# Patient Record
Sex: FEMALE | Race: WHITE | NOT HISPANIC OR LATINO | Employment: FULL TIME | URBAN - NONMETROPOLITAN AREA
[De-identification: names, ages, dates, MRNs, and addresses within clinical notes are randomized per-mention and may not be internally consistent; named-entity substitution may affect disease eponyms.]

---
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
## 2024-01-15 ENCOUNTER — ROUTINE PRENATAL (OUTPATIENT)
Dept: PERINATAL CARE | Facility: OTHER | Age: 19
End: 2024-01-15
Payer: COMMERCIAL

## 2024-01-15 ENCOUNTER — TELEPHONE (OUTPATIENT)
Dept: PERINATAL CARE | Facility: CLINIC | Age: 19
End: 2024-01-15

## 2024-01-15 VITALS
BODY MASS INDEX: 23.32 KG/M2 | SYSTOLIC BLOOD PRESSURE: 122 MMHG | WEIGHT: 131.6 LBS | DIASTOLIC BLOOD PRESSURE: 68 MMHG | HEART RATE: 110 BPM | HEIGHT: 63 IN

## 2024-01-15 DIAGNOSIS — Z87.74 HISTORY OF ATRIAL SEPTAL DEFECT: Primary | ICD-10-CM

## 2024-01-15 DIAGNOSIS — Z3A.01 7 WEEKS GESTATION OF PREGNANCY: ICD-10-CM

## 2024-01-15 DIAGNOSIS — Q24.9 CONGENITAL HEART DISEASE DURING PREGNANCY: ICD-10-CM

## 2024-01-15 DIAGNOSIS — O99.891 CONGENITAL HEART DISEASE DURING PREGNANCY: ICD-10-CM

## 2024-01-15 DIAGNOSIS — Z3A.12 12 WEEKS GESTATION OF PREGNANCY: ICD-10-CM

## 2024-01-15 DIAGNOSIS — Z36.82 ENCOUNTER FOR (NT) NUCHAL TRANSLUCENCY SCAN: ICD-10-CM

## 2024-01-15 PROCEDURE — 76801 OB US < 14 WKS SINGLE FETUS: CPT | Performed by: OBSTETRICS & GYNECOLOGY

## 2024-01-15 PROCEDURE — 76813 OB US NUCHAL MEAS 1 GEST: CPT | Performed by: OBSTETRICS & GYNECOLOGY

## 2024-01-15 PROCEDURE — 99243 OFF/OP CNSLTJ NEW/EST LOW 30: CPT | Performed by: OBSTETRICS & GYNECOLOGY

## 2024-01-15 NOTE — PROGRESS NOTES
"[facility]: Portia Osorio was seen today for nuchal translucency ultrasound.  See ultrasound report under \"OB Procedures\" tab.      MDM:   I. Diagnoses/Problems addressed:  Aneuploidy screening, maternal history of ASD  II.  Data: I ordered the following tests: TTE, NIPS.  III.  Risk of morbidity: Low    Please don't hesitate to contact our office with any concerns or questions.  -Myesha Aguirre MD    "

## 2024-01-15 NOTE — LETTER
"January 15, 2024    Margaux Gibson MD  [address]  [address]  [place] [id number]    Patient: Portia Osorio   YOB: 2005   Date of Visit: 1/15/2024   Gestational age 12w6d   Nature of this communication: Routine       Dear Dr Gibson,    This patient was seen recently in our  office.  The content of my evaluation today is in the ultrasound report under \"OB Procedures\" tab.     Please don't hesitate to contact our office with any concerns or questions.     Sincerely,      Myesha Aguirre MD  Attending Physician, Maternal-Fetal Medicine  [facility]      "

## 2024-01-15 NOTE — PROGRESS NOTES
Patient has [employer]  insurance. Patient was offered the self pay option through [employer] but declined. Explained to patient her insurance requires a prior authorization before [employer] NIPS can be drawn. In-basket message routed to Maternal Fetal Medicine clinical pool to complete prior authorization. Our office will contact the patient within 10-14 business days with the status of authorization. If patient does not hear back from our Maternal Fetal Medicine office after 14 business days to please call [phone].     Patient is aware if this test is drawn without prior authorization she may be responsible for full cost of test. Insurance Authorization is not a guarantee of payment and final determination is based on your member benefits, appropriateness of service provided and eligibility at time of services rendered and claim received.

## 2024-01-15 NOTE — TELEPHONE ENCOUNTER
Per [place] insurance via [employer] -no prior authorization or medial review is required for NIPT [id number].    Phone call to patient and notified insurance plan does not require authorization for NIPT.    Encouraged patient to determine if she met her lab deductible requirement with insurance plan.    For complete NIPT billing information patient can visit  [email or web address] or call Client Services  @ [phone].     Portia will call back at [phone] to schedule a nurse visit to have her blood drawn in office.

## 2024-01-15 NOTE — TELEPHONE ENCOUNTER
----- Message from Andry Whitfield, RN sent at 1/15/2024  8:58 AM EST -----  Regarding: Auth NIPS Mindy  Good Morning,    Above Patient would like [employer] NIPS with SCA and Gender  [employer] needs prior auth   Z36.0 Z33.1  standard codes   Dr. Aguirre      If someone can check the portal (I do not have access to it TY)  If Not I will call later      ~Andry

## 2024-01-22 PROBLEM — Z3A.14 14 WEEKS GESTATION OF PREGNANCY: Status: ACTIVE | Noted: 2023-12-05

## 2024-01-22 NOTE — PROGRESS NOTES
"OB/GYN  PN Visit  Portia Osorio  [id number]  2024  8:36 AM  Dr. Gillian Pretty MD    S: 18 y.o.  14 3/7 here for PN visit.       Chief Complaint   Patient presents with    Routine Prenatal Visit     No spotting. No pain or cramping.     Spotting had spotting, light pink, small clot, small amount that resolved.  No recent intercourse      OB complaints:  Contractions: no  Leakage: no  Bleeding: no  Fetal movement: no      O:  /82 (BP Location: Right arm, Patient Position: Sitting, Cuff Size: Adult)   Pulse (!) 109   Ht 5' 3\" (1.6 m)   Wt 59.4 kg (131 lb)   LMP 10/17/2023 (Exact Date)   BMI 23.21 kg/m²       Review of Systems   Constitutional: Negative.    HENT: Negative.     Eyes: Negative.    Respiratory: Negative.     Cardiovascular: Negative.    Gastrointestinal: Negative.    Endocrine: Negative.    Genitourinary:         As noted in HPI   Musculoskeletal: Negative.    Skin: Negative.    Allergic/Immunologic: Negative.    Neurological: Negative.    Hematological: Negative.    Psychiatric/Behavioral: Negative.           Physical Exam  Constitutional:       General: She is not in acute distress.     Appearance: Normal appearance. She is well-developed.   Abdominal:      Palpations: Abdomen is soft.      Tenderness: There is no abdominal tenderness. There is no guarding.   Neurological:      Mental Status: She is alert and oriented to person, place, and time.   Skin:     General: Skin is warm and dry.   Psychiatric:         Behavior: Behavior normal.             Pregravid Weight/BMI: 57.2 kg (126 lb) (BMI 22.33)  Current Weight: 59.4 kg (131 lb)   Total Weight Gain: 2.268 kg (5 lb)   Pre-Summer Vitals      Flowsheet Row Most Recent Value   Prenatal Assessment    Fetal Heart Rate 153   Prenatal Vitals    Blood Pressure 122/82   Weight - Scale 59.4 kg (131 lb)   Urine Albumin/Glucose    Dilation/Effacement/Station    Vaginal Drainage    Edema              TWG 25-35 lbs    Problem List       "    Other    14 weeks gestation of pregnancy    Supervision of normal first pregnancy, antepartum    Overview       CF/ SMA testing: declines  Flu vaccine: not completed-  declines  Covid vaccine:  completed X 2           Marijuana use    Overview     Urine drug screen ordered: + THC (24)         History of atrial septal defect    Overview     Resolved by age2, no surgery required         Rubella non-immune status, antepartum    Overview     Rubella titre: borderline, consider postpartum vaccine          Other Visit Diagnoses       Second trimester pregnancy    -  Primary    Need for maternal serum alpha-protein (MSAFP) screening               AFP ordered  NIPT not done - Level 2 ordered      Future Appointments   Date Time Provider Department Center   3/5/2024  8:00 AM  US 1 [facility]   3/21/2024  8:00 AM  US 2 [facility]               Gillian Pretty MD  2024  8:36 AM

## 2024-01-25 ENCOUNTER — TELEPHONE (OUTPATIENT)
Dept: OBGYN CLINIC | Facility: CLINIC | Age: 19
End: 2024-01-25

## 2024-01-25 ENCOUNTER — APPOINTMENT (OUTPATIENT)
Dept: LAB | Facility: HOSPITAL | Age: 19
End: 2024-01-25
Payer: COMMERCIAL

## 2024-01-25 DIAGNOSIS — Z34.90 ENCOUNTER FOR SUPERVISION OF NORMAL PREGNANCY, ANTEPARTUM, UNSPECIFIED GRAVIDITY: ICD-10-CM

## 2024-01-25 DIAGNOSIS — Z3A.10 10 WEEKS GESTATION OF PREGNANCY: ICD-10-CM

## 2024-01-25 LAB
ABO GROUP BLD: NORMAL
AMPHETAMINES SERPL QL SCN: NEGATIVE
BARBITURATES UR QL: NEGATIVE
BASOPHILS # BLD AUTO: 0.02 THOUSANDS/ÂΜL (ref 0–0.1)
BASOPHILS NFR BLD AUTO: 0 % (ref 0–1)
BENZODIAZ UR QL: NEGATIVE
BILIRUB UR QL STRIP: NEGATIVE
BLD GP AB SCN SERPL QL: NEGATIVE
CLARITY UR: CLEAR
COCAINE UR QL: NEGATIVE
COLOR UR: YELLOW
EOSINOPHIL # BLD AUTO: 0.03 THOUSAND/ÂΜL (ref 0–0.61)
EOSINOPHIL NFR BLD AUTO: 0 % (ref 0–6)
ERYTHROCYTE [DISTWIDTH] IN BLOOD BY AUTOMATED COUNT: 12.9 % (ref 11.6–15.1)
GLUCOSE UR STRIP-MCNC: NEGATIVE MG/DL
HCT VFR BLD AUTO: 37.1 % (ref 34.8–46.1)
HGB BLD-MCNC: 13 G/DL (ref 11.5–15.4)
HGB UR QL STRIP.AUTO: NEGATIVE
IMM GRANULOCYTES # BLD AUTO: 0.03 THOUSAND/UL (ref 0–0.2)
IMM GRANULOCYTES NFR BLD AUTO: 0 % (ref 0–2)
KETONES UR STRIP-MCNC: NEGATIVE MG/DL
LEUKOCYTE ESTERASE UR QL STRIP: NEGATIVE
LYMPHOCYTES # BLD AUTO: 1.31 THOUSANDS/ÂΜL (ref 0.6–4.47)
LYMPHOCYTES NFR BLD AUTO: 14 % (ref 14–44)
MCH RBC QN AUTO: 31.2 PG (ref 26.8–34.3)
MCHC RBC AUTO-ENTMCNC: 35 G/DL (ref 31.4–37.4)
MCV RBC AUTO: 89 FL (ref 82–98)
METHADONE UR QL: NEGATIVE
MONOCYTES # BLD AUTO: 0.42 THOUSAND/ÂΜL (ref 0.17–1.22)
MONOCYTES NFR BLD AUTO: 5 % (ref 4–12)
NEUTROPHILS # BLD AUTO: 7.43 THOUSANDS/ÂΜL (ref 1.85–7.62)
NEUTS SEG NFR BLD AUTO: 81 % (ref 43–75)
NITRITE UR QL STRIP: NEGATIVE
NRBC BLD AUTO-RTO: 0 /100 WBCS
OPIATES UR QL SCN: NEGATIVE
OXYCODONE+OXYMORPHONE UR QL SCN: NEGATIVE
PCP UR QL: NEGATIVE
PH UR STRIP.AUTO: 6 [PH]
PLATELET # BLD AUTO: 338 THOUSANDS/UL (ref 149–390)
PMV BLD AUTO: 9 FL (ref 8.9–12.7)
PROT UR STRIP-MCNC: NEGATIVE MG/DL
RBC # BLD AUTO: 4.17 MILLION/UL (ref 3.81–5.12)
RH BLD: POSITIVE
RUBV IGG SERPL IA-ACNC: 12.7 IU/ML
SP GR UR STRIP.AUTO: 1.02 (ref 1–1.03)
SPECIMEN EXPIRATION DATE: NORMAL
THC UR QL: POSITIVE
UROBILINOGEN UR QL STRIP.AUTO: 0.2 E.U./DL
WBC # BLD AUTO: 9.24 THOUSAND/UL (ref 4.31–10.16)

## 2024-01-25 PROCEDURE — 86900 BLOOD TYPING SEROLOGIC ABO: CPT

## 2024-01-25 PROCEDURE — 36415 COLL VENOUS BLD VENIPUNCTURE: CPT

## 2024-01-25 PROCEDURE — 87086 URINE CULTURE/COLONY COUNT: CPT

## 2024-01-25 PROCEDURE — 86850 RBC ANTIBODY SCREEN: CPT

## 2024-01-25 PROCEDURE — 86901 BLOOD TYPING SEROLOGIC RH(D): CPT

## 2024-01-25 PROCEDURE — 80307 DRUG TEST PRSMV CHEM ANLYZR: CPT

## 2024-01-25 PROCEDURE — 86778 TOXOPLASMA ANTIBODY IGM: CPT

## 2024-01-25 PROCEDURE — 87389 HIV-1 AG W/HIV-1&-2 AB AG IA: CPT

## 2024-01-25 PROCEDURE — 85025 COMPLETE CBC W/AUTO DIFF WBC: CPT

## 2024-01-25 PROCEDURE — 86780 TREPONEMA PALLIDUM: CPT

## 2024-01-25 PROCEDURE — 86762 RUBELLA ANTIBODY: CPT

## 2024-01-25 PROCEDURE — 86777 TOXOPLASMA ANTIBODY: CPT

## 2024-01-25 PROCEDURE — 86803 HEPATITIS C AB TEST: CPT

## 2024-01-25 PROCEDURE — 87340 HEPATITIS B SURFACE AG IA: CPT

## 2024-01-25 PROCEDURE — 86706 HEP B SURFACE ANTIBODY: CPT

## 2024-01-25 PROCEDURE — 81003 URINALYSIS AUTO W/O SCOPE: CPT

## 2024-01-25 NOTE — TELEPHONE ENCOUNTER
She needs to get her labs today so we know her blood type as will need rhogam if an negative RH factor.    You can offer her a visit today at [place] for fetal heart tones.    Or if just light spotting can keep appt tomorrow.      If heavier bleeding to ER.

## 2024-01-25 NOTE — TELEPHONE ENCOUNTER
Spoke with patient. Advised patient to go to the [facility] to complete labs to ensure we will have results before her scheduled appt tomorrow. Patient was ok to monitor at this time and reviewed recommendations to go to ER if pain/cramping, or bleeding worsen. Advised patient she is also welcome to always call back with any other concern. Patient understood and had no further questions.

## 2024-01-25 NOTE — TELEPHONE ENCOUNTER
Patient currently 14w2d. Patient calling in regards to having light pink spotting yesterday and into this morning. Patient has minor cramping. Pt was requesting an appt to be seen today. Did advise patient to complete prenatal lab work to get more information with her type and screen. Do not believe we have rhogam at [facility]? Or if patient should come in to be seen today. Please advise

## 2024-01-26 ENCOUNTER — ROUTINE PRENATAL (OUTPATIENT)
Dept: OBGYN CLINIC | Facility: CLINIC | Age: 19
End: 2024-01-26

## 2024-01-26 VITALS
WEIGHT: 131 LBS | HEIGHT: 63 IN | HEART RATE: 109 BPM | BODY MASS INDEX: 23.21 KG/M2 | DIASTOLIC BLOOD PRESSURE: 82 MMHG | SYSTOLIC BLOOD PRESSURE: 122 MMHG

## 2024-01-26 DIAGNOSIS — O09.899 RUBELLA NON-IMMUNE STATUS, ANTEPARTUM: ICD-10-CM

## 2024-01-26 DIAGNOSIS — Z87.74 HISTORY OF ATRIAL SEPTAL DEFECT: ICD-10-CM

## 2024-01-26 DIAGNOSIS — Z3A.14 14 WEEKS GESTATION OF PREGNANCY: ICD-10-CM

## 2024-01-26 DIAGNOSIS — Z34.00 SUPERVISION OF NORMAL FIRST PREGNANCY, ANTEPARTUM: ICD-10-CM

## 2024-01-26 DIAGNOSIS — Z36.1 NEED FOR MATERNAL SERUM ALPHA-PROTEIN (MSAFP) SCREENING: ICD-10-CM

## 2024-01-26 DIAGNOSIS — Z28.39 RUBELLA NON-IMMUNE STATUS, ANTEPARTUM: ICD-10-CM

## 2024-01-26 DIAGNOSIS — F12.90 MARIJUANA USE: ICD-10-CM

## 2024-01-26 DIAGNOSIS — Z34.92 SECOND TRIMESTER PREGNANCY: Primary | ICD-10-CM

## 2024-01-26 LAB
HBV SURFACE AB SER-ACNC: 11.6 MIU/ML
HBV SURFACE AG SER QL: NORMAL
HCV AB SER QL: NORMAL
HIV 1+2 AB+HIV1 P24 AG SERPL QL IA: NORMAL
HIV 2 AB SERPL QL IA: NORMAL
HIV1 AB SERPL QL IA: NORMAL
HIV1 P24 AG SERPL QL IA: NORMAL
TREPONEMA PALLIDUM IGG+IGM AB [PRESENCE] IN SERUM OR PLASMA BY IMMUNOASSAY: NORMAL

## 2024-01-26 PROCEDURE — PNV: Performed by: OBSTETRICS & GYNECOLOGY

## 2024-01-26 NOTE — PATIENT INSTRUCTIONS
Pregnancy at 15 to 18 Weeks   WHAT YOU NEED TO KNOW:   What changes are happening in my body?  Now that you are in your second trimester, you have more energy. You may also feel hungrier than usual. You may start to experience other symptoms, such as heartburn or dizziness. You may be gaining about ½ to 1 pound a week, and your pregnancy is beginning to show. You may need to start wearing maternity clothes.  How do I care for myself at this stage of my pregnancy?       Manage heartburn  by eating 4 or 5 small meals each day instead of large meals. Avoid spicy foods. Avoid eating right before bedtime.         Manage nausea and vomiting.  Avoid fatty and spicy foods. Eat small meals throughout the day instead of large meals. [place] may help to decrease nausea. Ask your healthcare provider about other ways of decreasing nausea and vomiting.    Eat a variety of healthy foods.  Healthy foods include fruits, vegetables, whole-grain breads, low-fat dairy foods, beans, lean meats, and fish. Drink liquids as directed. Ask how much liquid to drink each day and which liquids are best for you. Limit caffeine to less than 200 milligrams each day. Limit your intake of fish to 2 servings each week. Choose fish low in mercury such as canned light tuna, shrimp, salmon, cod, or tilapia. Do not  eat fish high in mercury such as swordfish, tilefish, iqra mackerel, and shark.         Take prenatal vitamins as directed.  Your need for certain vitamins and minerals, such as folic acid, increases during pregnancy. Prenatal vitamins provide some of the extra vitamins and minerals you need. Prenatal vitamins may also help to decrease the risk of certain birth defects.         Do not smoke.  Smoking increases your risk of a miscarriage and other health problems during your pregnancy. Smoking can cause your baby to be born too early or weigh less at birth. Ask your healthcare provider for information if you need help quitting.    Do not drink  alcohol.  Alcohol passes from your body to your baby through the placenta. It can affect your baby's brain development and cause fetal alcohol syndrome (FAS). FAS is a group of conditions that causes mental, behavior, and growth problems.    Talk to your healthcare provider before you take any medicines.  Many medicines may harm your baby if you take them when you are pregnant. Do not take any medicines, vitamins, herbs, or supplements without first talking to your healthcare provider. Never use illegal or street drugs (such as marijuana or cocaine) while you are pregnant.  What are some safety tips during pregnancy?   Avoid hot tubs and saunas.  Do not use a hot tub or sauna while you are pregnant, especially during your first trimester. Hot tubs and saunas may raise your baby's temperature and increase the risk of birth defects.    Avoid toxoplasmosis.  This is an infection caused by eating raw meat or being around infected cat feces. It can cause birth defects, miscarriages, and other problems. Wash your hands after you touch raw meat. Make sure any meat is well-cooked before you eat it. Avoid raw eggs and unpasteurized milk. Use gloves or ask someone else to clean your cat's litter box while you are pregnant.    What changes are happening with my baby?  By 18 weeks, your baby may be about 6 inches long from the top of the head to the rump (baby's bottom). Your baby may weigh about 11 ounces. You may be able to feel your baby's movement at about 18 weeks or later. The first movements may not be that noticeable. They may feel like a fluttering sensation. Your baby also makes sucking movements and can hear certain sounds.  What do I need to know about prenatal care?  During the first 28 weeks of your pregnancy, you will see your healthcare provider once a month. Your healthcare provider will check your blood pressure and weight. You may also need any of the following:  A urine test  may also be done to check for  sugar and protein. These can be signs of gestational diabetes or infection.    A blood test  may be done to check for anemia (low iron level).    Fundal height check  is a measurement of your uterus to check your baby's growth. This number is usually the same as the number of weeks that you have been pregnant.    An ultrasound  may be done to check your baby's development. Your healthcare provider may be able to tell you what your baby's gender is during the ultrasound.         Your baby's heart rate  will be checked.    When should I seek immediate care?   You have pain or cramping in your abdomen or low back.    You have heavy vaginal bleeding or clotting.    You pass material that looks like tissue or large clots. Collect the material and bring it with you.    When should I call my doctor or obstetrician?   You cannot keep food or drinks down, and you are losing weight.    You have light bleeding.    You have chills or a fever.    You have vaginal itching, burning, or pain.    You have yellow, green, white, or foul-smelling vaginal discharge.    You have pain or burning when you urinate, less urine than usual, or pink or bloody urine.    You have questions or concerns about your condition or care.    CARE AGREEMENT:   You have the right to help plan your care. Learn about your health condition and how it may be treated. Discuss treatment options with your healthcare providers to decide what care you want to receive. You always have the right to refuse treatment. The above information is an  only. It is not intended as medical advice for individual conditions or treatments. Talk to your doctor, nurse or pharmacist before following any medical regimen to see if it is safe and effective for you.  © Copyright [employer] 2023 Information is for End User's use only and may not be sold, redistributed or otherwise used for commercial purposes.

## 2024-01-27 LAB
BACTERIA UR CULT: NORMAL
CLINICAL COMMENT: NORMAL
T GONDII IGG SERPL IA-ACNC: <3 IU/ML (ref 0–7.1)
T GONDII IGM SER IA-ACNC: <3 AU/ML (ref 0–7.9)

## 2024-01-30 ENCOUNTER — NURSE TRIAGE (OUTPATIENT)
Dept: OTHER | Facility: OTHER | Age: 19
End: 2024-01-30

## 2024-01-30 NOTE — TELEPHONE ENCOUNTER
"Reason for Disposition   SPOTTING (single or brief episode)    Answer Assessment - Initial Assessment Questions  1. ONSET: \"When did this bleeding start?\"        This morning    2. DESCRIPTION: \"Describe the bleeding that you are having.\" \"How much bleeding is there?\"     - SPOTTING: spotting, or pinkish / brownish mucous discharge; does not fill panti-liner or pad     - MILD:  less than 1 pad / hour; less than patient's usual menstrual bleeding    - MODERATE: 1-2 pads / hour; 1 menstrual cup every 6 hours; small-medium blood clots (e.g., pea, grape, small coin)    - SEVERE: soaking 2 or more pads/hour for 2 or more hours; 1 menstrual cup every 2 hours; bleeding not contained by pads or continuous red blood from vagina; large blood clots (e.g., golf ball, large coin)       Spotting, light pink and brown discharge    3. ABDOMINAL PAIN SEVERITY: If present, ask: \"How bad is it?\"  (e.g., Scale 1-10; mild, moderate, or severe)    - MILD (1-3): doesn't interfere with normal activities, abdomen soft and not tender to touch     - MODERATE (4-7): interferes with normal activities or awakens from sleep, tender to touch     - SEVERE (8-10): excruciating pain, doubled over, unable to do any normal activities      Denies     4. PREGNANCY: \"Do you know how many weeks or months pregnant you are?\" \"When was the first day of your last normal menstrual period?\"      15 wks pregnant    5. HEMODYNAMIC STATUS: \"Are you weak or feeling lightheaded?\" If Yes, ask: \"Can you stand and walk normally?\"       Denies     6. OTHER SYMPTOMS: \"What other symptoms are you having with the bleeding?\" (e.g., passed tissue, vaginal discharge, fever, menstrual-type cramps)      Denies but has nasal congestion and mild HA    First pregnancy.      Protocol disposition discussed with pt.  Home care advice provided.  Reviewed call back precautions.  Pt verbalized understanding and was appreciative.  She denied having any further questions or " concerns.    Protocols used: Pregnancy - Vaginal Bleeding Less Than 20 Weeks EGA-ADULT-AH

## 2024-01-30 NOTE — TELEPHONE ENCOUNTER
Patient spotting has resolved. Advised patient to call back with any pain or if bleeding reoccurs. Patient understood

## 2024-01-30 NOTE — TELEPHONE ENCOUNTER
"Reason for Disposition  • SPOTTING (single or brief episode)    Answer Assessment - Initial Assessment Questions  1. ONSET: \"When did this bleeding start?\"        This morning    2. DESCRIPTION: \"Describe the bleeding that you are having.\" \"How much bleeding is there?\"     - SPOTTING: spotting, or pinkish / brownish mucous discharge; does not fill panti-liner or pad     - MILD:  less than 1 pad / hour; less than patient's usual menstrual bleeding    - MODERATE: 1-2 pads / hour; 1 menstrual cup every 6 hours; small-medium blood clots (e.g., pea, grape, small coin)    - SEVERE: soaking 2 or more pads/hour for 2 or more hours; 1 menstrual cup every 2 hours; bleeding not contained by pads or continuous red blood from vagina; large blood clots (e.g., golf ball, large coin)       Spotting, light pink and brown discharge    3. ABDOMINAL PAIN SEVERITY: If present, ask: \"How bad is it?\"  (e.g., Scale 1-10; mild, moderate, or severe)    - MILD (1-3): doesn't interfere with normal activities, abdomen soft and not tender to touch     - MODERATE (4-7): interferes with normal activities or awakens from sleep, tender to touch     - SEVERE (8-10): excruciating pain, doubled over, unable to do any normal activities      Denies     4. PREGNANCY: \"Do you know how many weeks or months pregnant you are?\" \"When was the first day of your last normal menstrual period?\"      15 wks pregnant    5. HEMODYNAMIC STATUS: \"Are you weak or feeling lightheaded?\" If Yes, ask: \"Can you stand and walk normally?\"       Denies     6. OTHER SYMPTOMS: \"What other symptoms are you having with the bleeding?\" (e.g., passed tissue, vaginal discharge, fever, menstrual-type cramps)      Denies but has nasal congestion and mild HA    Protocol disposition discussed with pt.  Home care advice provided.  Reviewed call back precautions.  Pt verbalized understanding and was appreciative.  She denied having any further questions or concerns.    Protocols used: " Pregnancy - Vaginal Bleeding Less Than 20 Weeks EGA-ADULT-AH

## 2024-01-30 NOTE — TELEPHONE ENCOUNTER
"Regarding: 15 weeks/ spotting when wipe  ----- Message from Michel Mike sent at 1/30/2024  7:09 AM EST -----  \" I am 15 weeks and there was some brownish-red spotting when I wiped.\"    "

## 2024-01-31 ENCOUNTER — ROUTINE PRENATAL (OUTPATIENT)
Dept: OBGYN CLINIC | Facility: CLINIC | Age: 19
End: 2024-01-31
Payer: COMMERCIAL

## 2024-01-31 VITALS
OXYGEN SATURATION: 99 % | BODY MASS INDEX: 23.5 KG/M2 | HEIGHT: 63 IN | HEART RATE: 100 BPM | SYSTOLIC BLOOD PRESSURE: 122 MMHG | WEIGHT: 132.6 LBS | DIASTOLIC BLOOD PRESSURE: 70 MMHG

## 2024-01-31 DIAGNOSIS — O09.899 RUBELLA NON-IMMUNE STATUS, ANTEPARTUM: ICD-10-CM

## 2024-01-31 DIAGNOSIS — N76.0 BV (BACTERIAL VAGINOSIS): ICD-10-CM

## 2024-01-31 DIAGNOSIS — B96.89 BV (BACTERIAL VAGINOSIS): ICD-10-CM

## 2024-01-31 DIAGNOSIS — Z87.74 HISTORY OF ATRIAL SEPTAL DEFECT: ICD-10-CM

## 2024-01-31 DIAGNOSIS — F12.90 MARIJUANA USE: ICD-10-CM

## 2024-01-31 DIAGNOSIS — Z3A.15 15 WEEKS GESTATION OF PREGNANCY: Primary | ICD-10-CM

## 2024-01-31 DIAGNOSIS — Z28.39 RUBELLA NON-IMMUNE STATUS, ANTEPARTUM: ICD-10-CM

## 2024-01-31 DIAGNOSIS — O46.92 SECOND TRIMESTER BLEEDING: ICD-10-CM

## 2024-01-31 DIAGNOSIS — Z34.00 SUPERVISION OF NORMAL FIRST PREGNANCY, ANTEPARTUM: ICD-10-CM

## 2024-01-31 LAB
BV WHIFF TEST VAG QL: ABNORMAL
CLUE CELLS SPEC QL WET PREP: PRESENT
PH SMN: 5 [PH]
T VAGINALIS VAG QL WET PREP: ABNORMAL
YEAST VAG QL WET PREP: ABNORMAL

## 2024-01-31 PROCEDURE — 99214 OFFICE O/P EST MOD 30 MIN: CPT | Performed by: OBSTETRICS & GYNECOLOGY

## 2024-01-31 PROCEDURE — 76817 TRANSVAGINAL US OBSTETRIC: CPT | Performed by: OBSTETRICS & GYNECOLOGY

## 2024-01-31 PROCEDURE — 87210 SMEAR WET MOUNT SALINE/INK: CPT | Performed by: OBSTETRICS & GYNECOLOGY

## 2024-01-31 RX ORDER — METRONIDAZOLE 500 MG/1
500 TABLET ORAL EVERY 12 HOURS SCHEDULED
Qty: 14 TABLET | Refills: 0 | Status: SHIPPED | OUTPATIENT
Start: 2024-01-31 | End: 2024-02-07

## 2024-01-31 NOTE — PROGRESS NOTES
"OB/GYN  PN Visit  Portia Osorio  [id number]  2024  12:42 PM  Dr. Gillian Pretty MD    S: 18 y.o.  15w1d here for OB problem      Chief Complaint   Patient presents with    Pregnancy Ultrasound     Patient having spotting for 2 days       Patient with spotting x 2 days  Light pink with small clots  No recent intercourse  She has no pain.        O:  /70   Pulse 100   Ht 5' 3\" (1.6 m)   Wt 60.1 kg (132 lb 9.6 oz)   LMP 10/17/2023 (Exact Date)   SpO2 99%   BMI 23.49 kg/m²       Review of Systems   Constitutional: Negative.    HENT: Negative.     Eyes: Negative.    Respiratory: Negative.     Cardiovascular: Negative.    Gastrointestinal: Negative.    Endocrine: Negative.    Genitourinary:         As noted in HPI   Musculoskeletal: Negative.    Skin: Negative.    Allergic/Immunologic: Negative.    Neurological: Negative.    Hematological: Negative.    Psychiatric/Behavioral: Negative.           Physical Exam  Constitutional:       General: She is not in acute distress.     Appearance: Normal appearance. She is well-developed.   Genitourinary:      Bladder and urethral meatus normal.      No lesions in the vagina.      Right Labia: No rash, tenderness, lesions, skin changes or Bartholin's cyst.     Left Labia: No tenderness, lesions, skin changes, Bartholin's cyst or rash.     Vaginal discharge present.      No vaginal erythema, tenderness or bleeding.      No vaginal prolapse present.     No vaginal atrophy present.       Right Adnexa: not tender, not full and no mass present.     Left Adnexa: not tender, not full and no mass present.     No cervical polyp.      Uterus is enlarged.      Uterus is not tender.      No uterine mass detected.     Pelvic exam was performed with patient in the lithotomy position.   Rectum:      No tenderness.   Cardiovascular:      Rate and Rhythm: Normal rate.   Pulmonary:      Effort: Pulmonary effort is normal.   Abdominal:      General: There is no distension. "      Palpations: Abdomen is soft.      Tenderness: There is no abdominal tenderness. There is no guarding.   Neurological:      Mental Status: She is alert and oriented to person, place, and time.   Skin:     General: Skin is warm and dry.   Psychiatric:         Behavior: Behavior normal.             Pregravid Weight/BMI: 57.2 kg (126 lb) (BMI 22.33)  Current Weight: 60.1 kg (132 lb 9.6 oz)   Total Weight Gain: 2.994 kg (6 lb 9.6 oz)   Pre- Vitals      Flowsheet Row Most Recent Value   Prenatal Assessment    Fetal Heart Rate 153   Fundal Height (cm) 15 cm   Prenatal Vitals    Blood Pressure 122/70   Weight - Scale 60.1 kg (132 lb 9.6 oz)   Urine Albumin/Glucose    Dilation/Effacement/Station    Cervical Dilation 0   Cervical Effacement 0   Fetal Station -5   Vaginal Drainage    Edema              Problem List          Other    15 weeks gestation of pregnancy    Supervision of normal first pregnancy, antepartum    Overview       CF/ SMA testing: declines  Flu vaccine: not completed-  declines  Covid vaccine:  completed X 2           Marijuana use    Overview     Urine drug screen ordered: + THC (24)         History of atrial septal defect    Overview     Resolved by age2, no surgery required         Rubella non-immune status, antepartum    Overview     Rubella titre: borderline, consider postpartum vaccine          Other Visit Diagnoses       Second trimester bleeding        BV (bacterial vaginosis)               Patient with second trimester bleeding.  No active bleeding noted at this time.  Cervical length is normal at 37 mm.  Cervix is closed.  Wet mount consistent with bacterial vaginosis.  Advised treatment with antibiotics and avoidance of intercourse while being treated.  Advised to call if with persistent vaginal bleeding.  Follow-up in 3 weeks for routine OB visit.    Future Appointments   Date Time Provider Department Center   2024 12:45 PM [facility] SILENT SCHED Complete [facility]   2024  8:30 AM Shankar Kohli PA-C Complete  [facility]   3/5/2024  8:00 AM  US 1 [facility]   3/21/2024  8:00 AM  US 2 [facility]               Gillian Pretty MD  2024  12:42 PM

## 2024-01-31 NOTE — TELEPHONE ENCOUNTER
"Regarding: 15 Weeks Pregnant, Spotting  ----- Message from Keturah Frazier sent at 1/30/2024  9:06 PM EST -----  \" I am 15 Weeks Pregnant and started Spotting again, a very light pink tinge on the tissue.\"    "

## 2024-01-31 NOTE — TELEPHONE ENCOUNTER
Spoke to patient. Patient reports that she is still having brownish discharge when wiping. Spoke with Dr. Pretty who states patient should come to office for evaluation. Patient scheduled for 11:45 am in [place].

## 2024-01-31 NOTE — TELEPHONE ENCOUNTER
"Reason for Disposition  • SPOTTING (single or brief episode)    Answer Assessment - Initial Assessment Questions  1. ONSET: \"When did this bleeding start?\"        Earlier this morning and then got better and now noticing again     2. DESCRIPTION: \"Describe the bleeding that you are having.\" \"How much bleeding is there?\"     - SPOTTING: spotting, or pinkish / brownish mucous discharge; does not fill panti-liner or pad     - MILD:  less than 1 pad / hour; less than patient's usual menstrual bleeding    - MODERATE: 1-2 pads / hour; 1 menstrual cup every 6 hours; small-medium blood clots (e.g., pea, grape, small coin)    - SEVERE: soaking 2 or more pads/hour for 2 or more hours; 1 menstrual cup every 2 hours; bleeding not contained by pads or continuous red blood from vagina; large blood clots (e.g., golf ball, large coin)       Spotting, pink tinge with some small clots    3. ABDOMINAL PAIN SEVERITY: If present, ask: \"How bad is it?\"  (e.g., Scale 1-10; mild, moderate, or severe)    - MILD (1-3): doesn't interfere with normal activities, abdomen soft and not tender to touch     - MODERATE (4-7): interferes with normal activities or awakens from sleep, tender to touch     - SEVERE (8-10): excruciating pain, doubled over, unable to do any normal activities      Denies    4. PREGNANCY: \"Do you know how many weeks or months pregnant you are?\" \"When was the first day of your last normal menstrual period?\"      Yes, 15 weeks  6 days     5. HEMODYNAMIC STATUS: \"Are you weak or feeling lightheaded?\" If Yes, ask: \"Can you stand and walk normally?\"       Denies    6. OTHER SYMPTOMS: \"What other symptoms are you having with the bleeding?\" (e.g., passed tissue, vaginal discharge, fever, menstrual-type cramps)      denies    Protocols used: Pregnancy - Vaginal Bleeding Less Than 20 Weeks [id number]    "

## 2024-02-22 ENCOUNTER — ROUTINE PRENATAL (OUTPATIENT)
Dept: OBGYN CLINIC | Facility: CLINIC | Age: 19
End: 2024-02-22

## 2024-02-22 ENCOUNTER — APPOINTMENT (OUTPATIENT)
Dept: LAB | Facility: CLINIC | Age: 19
End: 2024-02-22
Payer: COMMERCIAL

## 2024-02-22 VITALS
SYSTOLIC BLOOD PRESSURE: 130 MMHG | HEIGHT: 63 IN | WEIGHT: 137 LBS | DIASTOLIC BLOOD PRESSURE: 80 MMHG | BODY MASS INDEX: 24.27 KG/M2 | HEART RATE: 110 BPM

## 2024-02-22 DIAGNOSIS — Z34.00 SUPERVISION OF NORMAL FIRST PREGNANCY, ANTEPARTUM: Primary | ICD-10-CM

## 2024-02-22 DIAGNOSIS — Z36.1 NEED FOR MATERNAL SERUM ALPHA-PROTEIN (MSAFP) SCREENING: ICD-10-CM

## 2024-02-22 DIAGNOSIS — Z3A.19 19 WEEKS GESTATION OF PREGNANCY: ICD-10-CM

## 2024-02-22 DIAGNOSIS — O16.9 ELEVATED BLOOD PRESSURE AFFECTING PREGNANCY, ANTEPARTUM: ICD-10-CM

## 2024-02-22 DIAGNOSIS — Z3A.14 14 WEEKS GESTATION OF PREGNANCY: ICD-10-CM

## 2024-02-22 PROBLEM — R03.0 ELEVATED BLOOD PRESSURE READING IN OFFICE WITHOUT DIAGNOSIS OF HYPERTENSION: Status: ACTIVE | Noted: 2024-02-22

## 2024-02-22 LAB
ALBUMIN SERPL BCP-MCNC: 3.6 G/DL (ref 3.5–5)
ALP SERPL-CCNC: 44 U/L (ref 34–104)
ALT SERPL W P-5'-P-CCNC: 11 U/L (ref 7–52)
ANION GAP SERPL CALCULATED.3IONS-SCNC: 9 MMOL/L
AST SERPL W P-5'-P-CCNC: 15 U/L (ref 13–39)
BILIRUB SERPL-MCNC: 0.24 MG/DL (ref 0.2–1)
BUN SERPL-MCNC: 5 MG/DL (ref 5–25)
CALCIUM SERPL-MCNC: 8.7 MG/DL (ref 8.4–10.2)
CHLORIDE SERPL-SCNC: 104 MMOL/L (ref 96–108)
CO2 SERPL-SCNC: 26 MMOL/L (ref 21–32)
CREAT SERPL-MCNC: 0.41 MG/DL (ref 0.6–1.3)
CREAT UR-MCNC: 61.6 MG/DL
ERYTHROCYTE [DISTWIDTH] IN BLOOD BY AUTOMATED COUNT: 13.3 % (ref 11.6–15.1)
GFR SERPL CREATININE-BSD FRML MDRD: 150 ML/MIN/1.73SQ M
GLUCOSE SERPL-MCNC: 76 MG/DL (ref 65–140)
HCT VFR BLD AUTO: 35.4 % (ref 34.8–46.1)
HGB BLD-MCNC: 12 G/DL (ref 11.5–15.4)
MCH RBC QN AUTO: 31.8 PG (ref 26.8–34.3)
MCHC RBC AUTO-ENTMCNC: 33.9 G/DL (ref 31.4–37.4)
MCV RBC AUTO: 94 FL (ref 82–98)
PLATELET # BLD AUTO: 318 THOUSANDS/UL (ref 149–390)
PMV BLD AUTO: 9.2 FL (ref 8.9–12.7)
POTASSIUM SERPL-SCNC: 3.5 MMOL/L (ref 3.5–5.3)
PROT SERPL-MCNC: 6.1 G/DL (ref 6.4–8.4)
PROT UR-MCNC: 6 MG/DL
PROT/CREAT UR: 0.1 MG/G{CREAT} (ref 0–0.1)
RBC # BLD AUTO: 3.77 MILLION/UL (ref 3.81–5.12)
SODIUM SERPL-SCNC: 139 MMOL/L (ref 135–147)
WBC # BLD AUTO: 4.14 THOUSAND/UL (ref 4.31–10.16)

## 2024-02-22 PROCEDURE — PNV: Performed by: PHYSICIAN ASSISTANT

## 2024-02-22 PROCEDURE — 82105 ALPHA-FETOPROTEIN SERUM: CPT

## 2024-02-22 PROCEDURE — 80053 COMPREHEN METABOLIC PANEL: CPT

## 2024-02-22 PROCEDURE — 82570 ASSAY OF URINE CREATININE: CPT

## 2024-02-22 PROCEDURE — 84156 ASSAY OF PROTEIN URINE: CPT

## 2024-02-22 PROCEDURE — 36415 COLL VENOUS BLD VENIPUNCTURE: CPT

## 2024-02-22 PROCEDURE — 85027 COMPLETE CBC AUTOMATED: CPT

## 2024-02-22 NOTE — PROGRESS NOTES
18w2d pregnant female, here for prenatal visit. Pt well, without complaints.    MsAFP: ordered but not done.  Patient forgot, will go today.    Bleeding: no  Abdominal / pelvic pain/ cramping: no  Nausea/ vomiting: no      Recommend patient start PNV she had stopped due to N&V.  N&V now resolved so will try them again.      Pregravid Weight/BMI: 57.2 kg (126 lb) (BMI 22.33)  Current Weight:     Total Weight Gain: 4.99 kg (11 lb)          Vitals:    02/22/24 0814   BP: 150/82   Pulse: (!) 110   Repeat BP : 130/80    Fundal height: 18  Fetal Heart Rate: 148      Problem List          Other    19 weeks gestation of pregnancy    Supervision of normal first pregnancy, antepartum    Overview       CF/ SMA testing: declines  Flu vaccine: not completed-  declines  Covid vaccine:  completed X 2           Marijuana use    Overview     Urine drug screen ordered: + THC (1/25/24)         History of atrial septal defect    Overview     Resolved by age2, no surgery required         Rubella non-immune status, antepartum    Overview     Rubella titre: borderline, consider postpartum vaccine          Elevated initial blood pressure 150/82,  repeat /82.   CMP/ CBC/ urine TP:CR added to MsAFP.  F/u 1 week to recheck BP.    Ob visit in 1 weeks to recheck blood pressure  US scheduled 3/5/24  Fetal echo scheduled 3/21/24

## 2024-02-26 LAB
2ND TRIMESTER 4 SCREEN SERPL-IMP: NORMAL
AFP ADJ MOM SERPL: 1.17
AFP INTERP AMN-IMP: NORMAL
AFP INTERP SERPL-IMP: NORMAL
AFP INTERP SERPL-IMP: NORMAL
AFP SERPL-MCNC: 58.6 NG/ML
AGE AT DELIVERY: 20.4 YR
GA METHOD: NORMAL
GA: 18.3 WEEKS
IDDM PATIENT QL: NO
MULTIPLE PREGNANCY: NO
NEURAL TUBE DEFECT RISK FETUS: 7137 %

## 2024-03-01 ENCOUNTER — ROUTINE PRENATAL (OUTPATIENT)
Dept: OBGYN CLINIC | Facility: CLINIC | Age: 19
End: 2024-03-01

## 2024-03-01 VITALS
OXYGEN SATURATION: 99 % | HEIGHT: 63 IN | DIASTOLIC BLOOD PRESSURE: 80 MMHG | HEART RATE: 107 BPM | WEIGHT: 140.2 LBS | BODY MASS INDEX: 24.84 KG/M2 | SYSTOLIC BLOOD PRESSURE: 122 MMHG

## 2024-03-01 DIAGNOSIS — Z3A.19 19 WEEKS GESTATION OF PREGNANCY: ICD-10-CM

## 2024-03-01 DIAGNOSIS — Z87.74 HISTORY OF ATRIAL SEPTAL DEFECT: ICD-10-CM

## 2024-03-01 DIAGNOSIS — Z28.39 RUBELLA NON-IMMUNE STATUS, ANTEPARTUM: ICD-10-CM

## 2024-03-01 DIAGNOSIS — O09.899 RUBELLA NON-IMMUNE STATUS, ANTEPARTUM: ICD-10-CM

## 2024-03-01 DIAGNOSIS — Z34.00 SUPERVISION OF NORMAL FIRST PREGNANCY, ANTEPARTUM: Primary | ICD-10-CM

## 2024-03-01 DIAGNOSIS — F12.90 MARIJUANA USE: ICD-10-CM

## 2024-03-01 PROCEDURE — PNV: Performed by: PHYSICIAN ASSISTANT

## 2024-03-01 RX ORDER — PNV NO.95/FERROUS FUM/FOLIC AC 28MG-0.8MG
TABLET ORAL
COMMUNITY

## 2024-03-01 NOTE — PROGRESS NOTES
19w3d pregnant female, here for prenatal visit. Pt well, without complaints.  Brought in for BP check as initial BP elevated last visit.    MsAFP: negative   Anatomy scan: scheduled 3/5/24    No current outpatient medications on file prior to visit.     No current facility-administered medications on file prior to visit.       Pregravid Weight/BMI: 57.2 kg (126 lb) (BMI 22.33)  Current Weight:     Total Weight Gain: 6.441 kg (14 lb 3.2 oz)            Vitals:    03/01/24 0706   BP: 122/80   Pulse: (!) 107   SpO2: 99%       Fundal height: 19  Fetal Heart Rate: 148      Problem List          Other    19 weeks gestation of pregnancy    Supervision of normal first pregnancy, antepartum    Overview       CF/ SMA testing: declines  Flu vaccine: not completed-  declines  Covid vaccine:  completed X 2           Marijuana use    Overview     Urine drug screen ordered: + THC (1/25/24)         History of atrial septal defect    Overview     Resolved by age2, no surgery required         Rubella non-immune status, antepartum    Overview     Rubella titre: borderline, consider postpartum vaccine         Elevated blood pressure reading in office without diagnosis of hypertension    Overview     2/22/24:  /82,  repeat 130/ 82              Ob visit in 4 weeks  US scheduled 3/5/24

## 2024-03-05 ENCOUNTER — ROUTINE PRENATAL (OUTPATIENT)
Dept: PERINATAL CARE | Facility: OTHER | Age: 19
End: 2024-03-05
Payer: COMMERCIAL

## 2024-03-05 VITALS
WEIGHT: 142.8 LBS | BODY MASS INDEX: 25.3 KG/M2 | DIASTOLIC BLOOD PRESSURE: 70 MMHG | SYSTOLIC BLOOD PRESSURE: 128 MMHG | HEART RATE: 107 BPM | HEIGHT: 63 IN

## 2024-03-05 DIAGNOSIS — Z36.86 ENCOUNTER FOR ANTENATAL SCREENING FOR CERVICAL LENGTH: ICD-10-CM

## 2024-03-05 DIAGNOSIS — Z3A.20 20 WEEKS GESTATION OF PREGNANCY: ICD-10-CM

## 2024-03-05 DIAGNOSIS — O35.2XX0 HEREDITARY DISEASE IN FAMILY POSSIBLY AFFECTING FETUS, AFFECTING MANAGEMENT OF MOTHER IN PREGNANCY, SINGLE OR UNSPECIFIED FETUS: Primary | ICD-10-CM

## 2024-03-05 PROCEDURE — 99213 OFFICE O/P EST LOW 20 MIN: CPT | Performed by: OBSTETRICS & GYNECOLOGY

## 2024-03-05 PROCEDURE — 76817 TRANSVAGINAL US OBSTETRIC: CPT | Performed by: OBSTETRICS & GYNECOLOGY

## 2024-03-05 PROCEDURE — 76811 OB US DETAILED SNGL FETUS: CPT | Performed by: OBSTETRICS & GYNECOLOGY

## 2024-03-05 NOTE — PROGRESS NOTES
The patient was seen today for an ultrasound.  Please see ultrasound report (located under Ob Procedures) for additional details.   Thank you very much for allowing us to participate in the care of this very nice patient.  Should you have any questions, please do not hesitate to contact me.     Smith Ravi MD FACOG  Attending Physician, Maternal-Fetal Medicine  [facility]

## 2024-03-05 NOTE — PROGRESS NOTES
Ultrasound Probe Disinfection    A transvaginal ultrasound was performed.   Prior to use, disinfection was performed with High Level Disinfection Process ([employer]).  Probe serial number [id number]  was used.    Chaperone: ALCON Spencer RDMS

## 2024-03-20 NOTE — PROGRESS NOTES
Please refer to the [facility] ultrasound report in Ob Procedures for additional information regarding today's visit

## 2024-03-21 ENCOUNTER — ROUTINE PRENATAL (OUTPATIENT)
Dept: PERINATAL CARE | Facility: OTHER | Age: 19
End: 2024-03-21
Payer: COMMERCIAL

## 2024-03-21 VITALS
BODY MASS INDEX: 26.47 KG/M2 | WEIGHT: 149.4 LBS | DIASTOLIC BLOOD PRESSURE: 80 MMHG | HEIGHT: 63 IN | HEART RATE: 104 BPM | SYSTOLIC BLOOD PRESSURE: 132 MMHG

## 2024-03-21 DIAGNOSIS — Z87.74 HISTORY OF ATRIAL SEPTAL DEFECT: ICD-10-CM

## 2024-03-21 DIAGNOSIS — O35.2XX0 HEREDITARY FAMILIAL DISEASE AFFECTING MANAGEMENT OF MOTHER AND POSSIBLY AFFECTING FETUS, ANTEPARTUM, SINGLE OR UNSPECIFIED FETUS: ICD-10-CM

## 2024-03-21 DIAGNOSIS — Z3A.22 22 WEEKS GESTATION OF PREGNANCY: Primary | ICD-10-CM

## 2024-03-21 PROCEDURE — 93325 DOPPLER ECHO COLOR FLOW MAPG: CPT | Performed by: OBSTETRICS & GYNECOLOGY

## 2024-03-21 PROCEDURE — 76827 ECHO EXAM OF FETAL HEART: CPT | Performed by: OBSTETRICS & GYNECOLOGY

## 2024-03-21 PROCEDURE — 76825 ECHO EXAM OF FETAL HEART: CPT | Performed by: OBSTETRICS & GYNECOLOGY

## 2024-03-21 NOTE — LETTER
March 21, 2024     Gillian Pretty MD  [address]  [address]  [place] [address]    Patient: Portia Osorio   YOB: 2005   Date of Visit: 3/21/2024       Dear Dr. Pretty:    Thank you for referring Portia Osorio to me for evaluation. Below are my notes for this consultation.    If you have questions, please do not hesitate to call me. I look forward to following your patient along with you.         Sincerely,        Cj Vera MD        CC: No Recipients    Cj Vera MD  3/21/2024  8:14 AM  Sign when Signing Visit  Please refer to the [facility] ultrasound report in Ob Procedures for additional information regarding today's visit

## 2024-03-28 ENCOUNTER — ROUTINE PRENATAL (OUTPATIENT)
Dept: OBGYN CLINIC | Facility: CLINIC | Age: 19
End: 2024-03-28

## 2024-03-28 VITALS
SYSTOLIC BLOOD PRESSURE: 130 MMHG | HEIGHT: 63 IN | OXYGEN SATURATION: 99 % | HEART RATE: 99 BPM | DIASTOLIC BLOOD PRESSURE: 78 MMHG | BODY MASS INDEX: 27.29 KG/M2 | WEIGHT: 154 LBS

## 2024-03-28 DIAGNOSIS — F12.90 MARIJUANA USE: ICD-10-CM

## 2024-03-28 DIAGNOSIS — Z34.00 SUPERVISION OF NORMAL FIRST PREGNANCY, ANTEPARTUM: ICD-10-CM

## 2024-03-28 DIAGNOSIS — O09.899 RUBELLA NON-IMMUNE STATUS, ANTEPARTUM: ICD-10-CM

## 2024-03-28 DIAGNOSIS — Z87.74 HISTORY OF ATRIAL SEPTAL DEFECT: ICD-10-CM

## 2024-03-28 DIAGNOSIS — Z28.39 RUBELLA NON-IMMUNE STATUS, ANTEPARTUM: ICD-10-CM

## 2024-03-28 DIAGNOSIS — Z3A.23 23 WEEKS GESTATION OF PREGNANCY: Primary | ICD-10-CM

## 2024-03-28 PROCEDURE — PNV: Performed by: OBSTETRICS & GYNECOLOGY

## 2024-03-28 NOTE — PROGRESS NOTES
"    S: 19 y.o.  23w2d here for routine prenatal visit.        Chief Complaint   Patient presents with    Routine Prenatal Visit     No concerns         OB complaints:  Contractions: no  Leakage: no  Bleeding: no  Fetal movement: yes    Has upcoming gender reveal       O:  /78 (BP Location: Right arm, Patient Position: Sitting, Cuff Size: Adult)   Pulse 99   Ht 5' 3\" (1.6 m)   Wt 69.9 kg (154 lb)   LMP 10/17/2023 (Exact Date)   SpO2 99%   BMI 27.28 kg/m²       Review of Systems   Constitutional:  Negative for chills and fever.   Eyes:  Negative for visual disturbance.   Respiratory:  Negative for chest tightness and shortness of breath.    Cardiovascular:  Negative for chest pain.   Gastrointestinal:  Negative for abdominal pain, diarrhea, nausea and vomiting.   Genitourinary:  Negative for pelvic pain and vaginal bleeding.        As noted in HPI   Skin:  Negative for rash.   Neurological:  Negative for headaches.   All other systems reviewed and are negative.        Physical Exam  Constitutional:       General: She is not in acute distress.     Appearance: Normal appearance.   HENT:      Head: Normocephalic and atraumatic.   Cardiovascular:      Rate and Rhythm: Normal rate.   Pulmonary:      Effort: Pulmonary effort is normal. No respiratory distress.   Abdominal:      General: There is no distension.      Palpations: Abdomen is soft.      Tenderness: There is no abdominal tenderness. There is no guarding or rebound.      Comments: gravid   Neurological:      General: No focal deficit present.      Mental Status: She is alert.   Psychiatric:         Mood and Affect: Mood normal.         Behavior: Behavior normal.   Vitals and nursing note reviewed.           Fundal Height (cm): 23 cm  Fetal Heart Rate: 150    Pregravid Weight/BMI: 57.2 kg (126 lb) (BMI 22.33)  Current Weight: 69.9 kg (154 lb)   Total Weight Gain: 12.7 kg (28 lb)     Pre- Vitals      Flowsheet Row Most Recent Value   Prenatal " Assessment    Fetal Heart Rate 150   Fundal Height (cm) 23 cm   Movement Present   Prenatal Vitals    Blood Pressure 130/78   Weight - Scale 69.9 kg (154 lb)   Urine Albumin/Glucose    Dilation/Effacement/Station    Vaginal Drainage    Edema                     Problem List       23 weeks gestation of pregnancy    Supervision of normal first pregnancy, antepartum    Overview       CF/ SMA testing: declines  Flu vaccine: not completed-  declines  Covid vaccine:  completed X 2  Aneuploidy screening declined. msAFP low risk           Current Assessment & Plan     Midtrimester labs ordered  Growth US scheduled  OB precautions reviewed         Marijuana use    Overview     Urine drug screen ordered: + THC (1/25/24)         History of atrial septal defect    Overview     Resolved by age2, no surgery required         Rubella non-immune status, antepartum    Overview     Rubella titre: borderline, consider postpartum vaccine         Elevated blood pressure reading in office without diagnosis of hypertension    Overview     2/22/24:  /82,  repeat 130/ 82                              Margaux Gibson MD  3/28/2024  8:08 AM

## 2024-04-04 ENCOUNTER — APPOINTMENT (OUTPATIENT)
Dept: LAB | Facility: HOSPITAL | Age: 19
End: 2024-04-04
Payer: COMMERCIAL

## 2024-04-04 DIAGNOSIS — Z34.00 SUPERVISION OF NORMAL FIRST PREGNANCY, ANTEPARTUM: ICD-10-CM

## 2024-04-04 LAB
ERYTHROCYTE [DISTWIDTH] IN BLOOD BY AUTOMATED COUNT: 12.6 % (ref 11.6–15.1)
FERRITIN SERPL-MCNC: 6 NG/ML (ref 11–307)
GLUCOSE 1H P 50 G GLC PO SERPL-MCNC: 92 MG/DL (ref 70–134)
HCT VFR BLD AUTO: 33.1 % (ref 34.8–46.1)
HGB BLD-MCNC: 10.9 G/DL (ref 11.5–15.4)
MCH RBC QN AUTO: 31.3 PG (ref 26.8–34.3)
MCHC RBC AUTO-ENTMCNC: 32.9 G/DL (ref 31.4–37.4)
MCV RBC AUTO: 95 FL (ref 82–98)
PLATELET # BLD AUTO: 296 THOUSANDS/UL (ref 149–390)
PMV BLD AUTO: 9.3 FL (ref 8.9–12.7)
RBC # BLD AUTO: 3.48 MILLION/UL (ref 3.81–5.12)
TREPONEMA PALLIDUM IGG+IGM AB [PRESENCE] IN SERUM OR PLASMA BY IMMUNOASSAY: NORMAL
WBC # BLD AUTO: 6.81 THOUSAND/UL (ref 4.31–10.16)

## 2024-04-04 PROCEDURE — 85027 COMPLETE CBC AUTOMATED: CPT

## 2024-04-04 PROCEDURE — 82950 GLUCOSE TEST: CPT

## 2024-04-04 PROCEDURE — 36415 COLL VENOUS BLD VENIPUNCTURE: CPT

## 2024-04-04 PROCEDURE — 82728 ASSAY OF FERRITIN: CPT

## 2024-04-04 PROCEDURE — 86780 TREPONEMA PALLIDUM: CPT

## 2024-04-10 ENCOUNTER — DOCUMENTATION (OUTPATIENT)
Facility: HOSPITAL | Age: 19
End: 2024-04-10

## 2024-04-10 NOTE — PROGRESS NOTES
"Submitted request on [employer]. Pre-cert was required. Per website \"Your request for Resting Transthoracic Echocardiography does not meet medical necessity criteria based on the information provided.\"       "

## 2024-04-16 ENCOUNTER — ROUTINE PRENATAL (OUTPATIENT)
Dept: OBGYN CLINIC | Facility: CLINIC | Age: 19
End: 2024-04-16

## 2024-04-16 ENCOUNTER — NURSE TRIAGE (OUTPATIENT)
Age: 19
End: 2024-04-16

## 2024-04-16 VITALS
HEART RATE: 103 BPM | WEIGHT: 159 LBS | BODY MASS INDEX: 28.17 KG/M2 | OXYGEN SATURATION: 90 % | SYSTOLIC BLOOD PRESSURE: 126 MMHG | DIASTOLIC BLOOD PRESSURE: 80 MMHG

## 2024-04-16 DIAGNOSIS — R51.9 NONINTRACTABLE HEADACHE, UNSPECIFIED CHRONICITY PATTERN, UNSPECIFIED HEADACHE TYPE: Primary | ICD-10-CM

## 2024-04-16 PROCEDURE — PNV: Performed by: PHYSICIAN ASSISTANT

## 2024-04-16 RX ORDER — METOCLOPRAMIDE 10 MG/1
10 TABLET ORAL 4 TIMES DAILY
Qty: 30 TABLET | Refills: 0 | Status: SHIPPED | OUTPATIENT
Start: 2024-04-16

## 2024-04-16 RX ORDER — LANOLIN ALCOHOL/MO/W.PET/CERES
325 CREAM (GRAM) TOPICAL
COMMUNITY

## 2024-04-16 NOTE — TELEPHONE ENCOUNTER
Please triage headaches to see if warrants ED eval.  Otherwise move up ob visit so that we can check her BP

## 2024-04-16 NOTE — PATIENT INSTRUCTIONS
- Can try tylenol 1000 mg (two extra strength tabs) at once - do not take more than 3000 mg in a 24 hour period  - If tylenol is not effective can try prescription reglan 10 mg, can be taken up to every 6 hours as needed  - If caffeine helps with migraines then caffeine containing medications are OK to take, but should ideally not exceed more than 200 mg of caffeine a day (an 8 oz cup of coffee is ~100 mg of caffeine)  - You can take benadryl 25-50 mg with above medications as they can be helpful when used together, however benadryl can be sedating  - Avoid NSAIDs unless directed by your doctor.  - Continue to avoid migraine triggers: maintain a regular meal and sleep pattern, hydrate frequently  - If your migraines are frequent you can try these supplements for prevention: magnesium 400 mg daily, riboflavin 400 mg daily

## 2024-04-16 NOTE — TELEPHONE ENCOUNTER
Patient is scheduled for appt 4/25. She has been having headaches for 1 wk. She is looking for recommendations or if she needs to move the appt to an earlier date.

## 2024-04-16 NOTE — TELEPHONE ENCOUNTER
"Patient called 26w0d called c/o headaches starting over the last 1-2 weeks, waking up with them at times. Patient takes Tylenol 1000 mg - 2000 mg daily when she has HA's. States they are worse at night. States she had a severe HA last night [id number] and did not go to work this AM. Patient does not currently have a HA. Denies visual changes, RUQ pain, congestion, allergy symptoms, denies history of migraines. Drinks at least 64 ounces daily. Appointment scheduled in office for today for evaluation and BP check.    Reason for Disposition   MILD-MODERATE headache and present > 72 hours    Answer Assessment - Initial Assessment Questions  1. LOCATION: \"Where does it hurt?\"       All over  2. ONSET: \"When did the headache start?\" (Minutes, hours or days)       1-2  3. PATTERN: \"Does the pain come and go, or has it been constant since it started?\"      intermittent  4. SEVERITY: \"How bad is the pain?\" and \"What does it keep you from doing?\"     - MILD - doesn't interfere with normal activities     - MODERATE - interferes with normal activities or awakens from sleep     - SEVERE - excruciating pain, unable to do any normal activities         varies  5. RECURRENT SYMPTOM: \"Have you ever had headaches before?\" If Yes, ask: \"When was the last time?\" and \"What happened that time?\"       Denies history  6. CAUSE: \"What do you think is causing the headache?\"      unknown  7. MIGRAINE: \"Have you been diagnosed with migraine headaches?\" If Yes, ask: \"Is this headache similar?\"       denies  8. HEAD INJURY: \"Has there been any recent injury to the head?\"       denies  9. OTHER SYMPTOMS: \"Do you have any other symptoms?\" (e.g., fever, stiff neck, blurred vision; swelling of hands, face, or feet)      denies  10. PREGNANCY: \"How many weeks pregnant are you?\"        26w0d  11. THAIS: \"What date are you expecting to deliver?\"        7/23/24    Protocols used: Pregnancy - Headache-ADULT-[place]    "

## 2024-04-19 PROBLEM — Z3A.27 27 WEEKS GESTATION OF PREGNANCY: Status: ACTIVE | Noted: 2023-12-05

## 2024-04-22 PROBLEM — O10.919 CHRONIC HYPERTENSION AFFECTING PREGNANCY: Status: ACTIVE | Noted: 2024-02-22

## 2024-04-26 ENCOUNTER — ROUTINE PRENATAL (OUTPATIENT)
Dept: OBGYN CLINIC | Facility: CLINIC | Age: 19
End: 2024-04-26

## 2024-04-26 VITALS — BODY MASS INDEX: 28.7 KG/M2 | DIASTOLIC BLOOD PRESSURE: 80 MMHG | SYSTOLIC BLOOD PRESSURE: 124 MMHG | WEIGHT: 162 LBS

## 2024-04-26 DIAGNOSIS — O99.019 ANEMIA DURING PREGNANCY: ICD-10-CM

## 2024-04-26 DIAGNOSIS — Z34.92 SECOND TRIMESTER PREGNANCY: Primary | ICD-10-CM

## 2024-04-26 DIAGNOSIS — O10.919 CHRONIC HYPERTENSION AFFECTING PREGNANCY: ICD-10-CM

## 2024-04-26 DIAGNOSIS — Z87.74 HISTORY OF ATRIAL SEPTAL DEFECT: ICD-10-CM

## 2024-04-26 DIAGNOSIS — Z3A.27 27 WEEKS GESTATION OF PREGNANCY: ICD-10-CM

## 2024-04-26 PROCEDURE — PNV: Performed by: OBSTETRICS & GYNECOLOGY

## 2024-04-26 NOTE — PATIENT INSTRUCTIONS
Pregnancy at 27 to 30 Weeks   WHAT YOU NEED TO KNOW:   You may notice new symptoms such as shortness of breath, heartburn, or swelling of your ankles and feet. You may also have trouble sleeping or contractions.  DISCHARGE INSTRUCTIONS:   Return to the emergency department if:   You develop a severe headache that does not go away.    You have new or increased vision changes, such as blurred or spotted vision.    You have new or increased swelling in your face or hands.    You have vaginal spotting or bleeding.    Your water broke or you feel warm water gushing or trickling from your vagina.    Call your doctor or obstetrician if:   You have more than 5 contractions in 1 hour.    You notice any changes in your baby's movements.    You have abdominal cramps, pressure, or tightening.    You have a change in vaginal discharge.    You have chills or a fever.    You have vaginal itching, burning, or pain.    You have yellow, green, white, or foul-smelling vaginal discharge.    You have pain or burning when you urinate, less urine than usual, or pink or bloody urine.    You have questions or concerns about your condition or care.    How to care for yourself at this stage of your pregnancy:       Eat a variety of healthy foods.  Healthy foods include fruits, vegetables, whole-grain breads, low-fat dairy foods, beans, lean meats, and fish. Drink liquids as directed. Ask how much liquid to drink each day and which liquids are best for you. Limit caffeine to less than 200 milligrams each day. Limit your intake of fish to 2 servings each week. Choose fish low in mercury such as canned light tuna, shrimp, salmon, cod, or tilapia. Do not  eat fish high in mercury such as swordfish, tilefish, iqra mackerel, and shark.         Manage heartburn  by eating 4 or 5 small meals each day instead of large meals. Avoid spicy food.         Manage swelling  by lying down and putting your feet up.         Take prenatal vitamins as directed.   Your need for certain vitamins and minerals, such as folic acid, increases during pregnancy. Prenatal vitamins provide some of the extra vitamins and minerals you need. Prenatal vitamins may also help to decrease the risk of certain birth defects.         Talk to your healthcare provider about exercise.  Moderate exercise can help you stay fit. Your healthcare provider will help you plan an exercise program that is safe for you during pregnancy.         Do not smoke.  Smoking increases your risk of a miscarriage and other health problems during your pregnancy. Smoking can cause your baby to be born too early or weigh less at birth. Ask your healthcare provider for information if you need help quitting.    Do not drink alcohol.  Alcohol passes from your body to your baby through the placenta. It can affect your baby's brain development and cause fetal alcohol syndrome (FAS). FAS is a group of conditions that causes mental, behavior, and growth problems.    Talk to your healthcare provider before you take any medicines.  Many medicines may harm your baby if you take them when you are pregnant. Do not take any medicines, vitamins, herbs, or supplements without first talking to your healthcare provider. Never use illegal or street drugs (such as marijuana or cocaine) while you are pregnant.  Safety tips during pregnancy:   Avoid hot tubs and saunas.  Do not use a hot tub or sauna while you are pregnant, especially during your first trimester. Hot tubs and saunas may raise your baby's temperature and increase the risk of birth defects.    Avoid toxoplasmosis.  This is an infection caused by eating raw meat or being around infected cat feces. It can cause birth defects, miscarriages, and other problems. Wash your hands after you touch raw meat. Make sure any meat is well-cooked before you eat it. Avoid raw eggs and unpasteurized milk. Use gloves or ask someone else to clean your cat's litter box while you are pregnant.        Changes that are happening with your baby:  By 30 weeks, your baby may weigh more than 3 pounds. Your baby may be about 11 inches long from the top of the head to the rump (baby's bottom). Your baby's eyes open and close now. Your baby's kicks and movements are more forceful at this time.  What you need to know about prenatal care:  Your healthcare provider will check your blood pressure and weight. You may also need the following:  Blood tests  may be done to check for anemia or blood type.    A urine test  may also be done to check for sugar and protein. These can be signs of gestational diabetes or infection. Protein in your urine may also be a sign of preeclampsia. Preeclampsia is a condition that can develop during week 20 or later of your pregnancy. It causes high blood pressure, and it can cause problems with your kidneys and other organs.    A Tdap vaccine and flu vaccine  may be recommended by your healthcare provider.    A gestational diabetes screen  may be done. Your healthcare provider may order either a 1-step or 2-step oral glucose tolerance test (OGTT).     1-step OGTT:  Your blood sugar level will be tested after you have not eaten for 8 hours (fasting). You will then be given a glucose drink. Your level will be tested again 1 hour and 2 hours after you finish the drink.    2-step OGTT:  You do not have to fast for the first part of the test. You will have the glucose drink at any time of day. Your blood sugar level will be checked 1 hour later. If your blood sugar is higher than a certain level, another test will be ordered. You will fast and your blood sugar level will be tested. You will have the glucose drink. Your blood will be tested again 1 hour, 2 hours, and 3 hours after you finish the glucose drink.    Fundal height  is a measurement of your uterus to check your baby's growth. This number is usually the same as the number of weeks that you have been pregnant. Your healthcare provider  may also check your baby's position.    Your baby's heart rate  will be checked.    Follow up with your doctor or obstetrician as directed:  Write down your questions so you remember to ask them during your visits.  © Copyright [employer] 2023 Information is for End User's use only and may not be sold, redistributed or otherwise used for commercial purposes.  The above information is an  only. It is not intended as medical advice for individual conditions or treatments. Talk to your doctor, nurse or pharmacist before following any medical regimen to see if it is safe and effective for you.

## 2024-04-26 NOTE — PROGRESS NOTES
OB/GYN  PN Visit  Portia Osorio  [id number]  2024  8:21 AM  Dr. Gillian Pretty MD    S: 19 y.o.  27w3d here for PN visit.       Chief Complaint   Patient presents with    Routine Prenatal Visit         OB complaints:  Contractions: no  Leakage: no  Bleeding: no  Fetal movement: yes      O:  /80   Wt 73.5 kg (162 lb)   LMP 10/17/2023 (Exact Date)   BMI 28.70 kg/m²       Review of Systems   Constitutional: Negative.    HENT: Negative.     Eyes: Negative.    Respiratory: Negative.     Cardiovascular: Negative.    Gastrointestinal: Negative.    Endocrine: Negative.    Genitourinary:         As noted in HPI   Musculoskeletal: Negative.    Skin: Negative.    Allergic/Immunologic: Negative.    Neurological: Negative.    Hematological: Negative.    Psychiatric/Behavioral: Negative.           Physical Exam  Constitutional:       General: She is not in acute distress.     Appearance: Normal appearance. She is well-developed.   Abdominal:      Palpations: Abdomen is soft.      Tenderness: There is no abdominal tenderness. There is no guarding.   Neurological:      Mental Status: She is alert and oriented to person, place, and time.   Skin:     General: Skin is warm and dry.   Psychiatric:         Behavior: Behavior normal.             Pregravid Weight/BMI: 57.2 kg (126 lb) (BMI 22.33)  Current Weight: 73.5 kg (162 lb)   Total Weight Gain: 16.3 kg (36 lb)   Pre-Summer Vitals      Flowsheet Row Most Recent Value   Prenatal Assessment    Fetal Heart Rate 158   Fundal Height (cm) 27 cm   Movement Present   Prenatal Vitals    Blood Pressure 124/80   Weight - Scale 73.5 kg (162 lb)   Urine Albumin/Glucose    Dilation/Effacement/Station    Vaginal Drainage    Edema              Problem List          Cardiovascular and Mediastinum    Chronic hypertension affecting pregnancy    Overview     24 @ 18 wks:  /82,  repeat 130/ 82  3/5/24 @ 20 wks:  /82  No current meds            Behavioral Health     Marijuana use    Overview     Urine drug screen ordered: + THC (24)            Blood    Anemia during pregnancy       Obstetrics/Gynecology    27 weeks gestation of pregnancy    Supervision of normal first pregnancy, antepartum    Overview       CF/ SMA testing: declines  Flu vaccine: not completed-  declines  Covid vaccine:  completed X 2  Aneuploidy screening declined. msAFP low risk           Rubella non-immune status, antepartum    Overview     Rubella titre: borderline, consider postpartum vaccine            Other    History of atrial septal defect    Overview     Resolved by age2, no surgery required  Fetal echo normal  Maternal echo ordered          Other Visit Diagnoses       Second trimester pregnancy    -  Primary           Info on TDAP given - undecided about gwtting  [facility]  Maternal echo ordered  Follow-up in 2 weeks    Future Appointments   Date Time Provider Department Center   2024  9:15 AM  US 3 [place] [facility]               Gillian Pretty MD  2024  8:21 AM

## 2024-04-30 ENCOUNTER — ULTRASOUND (OUTPATIENT)
Dept: PERINATAL CARE | Facility: OTHER | Age: 19
End: 2024-04-30
Payer: COMMERCIAL

## 2024-04-30 VITALS
SYSTOLIC BLOOD PRESSURE: 130 MMHG | BODY MASS INDEX: 29.16 KG/M2 | WEIGHT: 164.6 LBS | DIASTOLIC BLOOD PRESSURE: 72 MMHG | HEIGHT: 63 IN | HEART RATE: 103 BPM

## 2024-04-30 DIAGNOSIS — Z3A.28 28 WEEKS GESTATION OF PREGNANCY: ICD-10-CM

## 2024-04-30 DIAGNOSIS — Z36.89 ENCOUNTER FOR ULTRASOUND TO ASSESS FETAL GROWTH: ICD-10-CM

## 2024-04-30 DIAGNOSIS — O10.919 CHRONIC HYPERTENSION AFFECTING PREGNANCY: Primary | ICD-10-CM

## 2024-04-30 PROCEDURE — 76816 OB US FOLLOW-UP PER FETUS: CPT | Performed by: OBSTETRICS & GYNECOLOGY

## 2024-04-30 PROCEDURE — 99213 OFFICE O/P EST LOW 20 MIN: CPT | Performed by: NURSE PRACTITIONER

## 2024-04-30 NOTE — PROGRESS NOTES
Portia Osorio has no complaints today.  She reports regular fetal movements and does not report any problems.  She is here today at 28w0d for evaluation of fetal weight and amniotic fluid.  Her 1 hour Glucola screen on 4/4 was 92. Her initial BP was 148/90 with a repeat of 130/72. She reports no signs of preeclampsia.         Problem list:  1.  Chronic hypertension not on medication  2.  Maternal history of ASD with normal fetal echocardiogram     Ultrasound findings:  A viable miller intrauterine pregnancy is seen. Estimated fetal weight and amniotic fluid are within normal limits for gestational age.  Growth is at the 83rd percentile.  No fetal anomalies are visualized on limited survey. Placenta is within normal limits.         Pregnancy ultrasound has limitations and is unable to detect all forms of fetal congenital abnormalities.  The inaccuracy in the EFW can be off by 1 lb either way in the third trimester.     Specific counseling was provided on the following problems:  1.  We discussed her diagnosis of chronic hypertension, not requiring antihypertensive therapy. Baseline Pree labs were normal. If she remains well controlled off medication,   She should have serial evaluation of fetal growth every 4-6 weeks. We discussed that goal blood pressure for pregnancy based on CHAPS trial is <140/90. Antepartum fetal surveillance would only be indicated if medical management of hypertension is necessary. Delivery timing can be planned as per ACOG recommendations.     Follow up recommended:   1.  Growth scan at 34 weeks.     Split-shared decision-making between DEISI Aquino and myself was utilized, with the majority of the time spent by OWEN Aquino.  Medical decision-making for this encounter was low (diagnosis low, data limited and risk low). .    Procedures that were completed today were charged separately.     Fouzia Russo M.D.

## 2024-04-30 NOTE — LETTER
April 30, 2024     Margaux Gibson MD  [address]  [address]  [place] [id number]    Patient: Portia Osorio   YOB: 2005   Date of Visit: 4/30/2024       Dear Dr. Gibson:    Thank you for referring Portia Osorio to me for evaluation. Below are my notes for this consultation.    If you have questions, please do not hesitate to call me. I look forward to following your patient along with you.         Sincerely,        Fouzia Russo MD        CC: No Recipients    Fouzia Russo MD  4/30/2024  5:48 PM  Sign when Signing Visit  Portia Osorio has no complaints today.  She reports regular fetal movements and does not report any problems.  She is here today at 28w0d for evaluation of fetal weight and amniotic fluid.  Her 1 hour Glucola screen on 4/4 was 92. Her initial BP was 148/90 with a repeat of 130/72. She reports no signs of preeclampsia.         Problem list:  1.  Chronic hypertension not on medication  2.  Maternal history of ASD with normal fetal echocardiogram     Ultrasound findings:  A viable miller intrauterine pregnancy is seen. Estimated fetal weight and amniotic fluid are within normal limits for gestational age.  Growth is at the 83rd percentile.  No fetal anomalies are visualized on limited survey. Placenta is within normal limits.         Pregnancy ultrasound has limitations and is unable to detect all forms of fetal congenital abnormalities.  The inaccuracy in the EFW can be off by 1 lb either way in the third trimester.     Specific counseling was provided on the following problems:  1.  We discussed her diagnosis of chronic hypertension, not requiring antihypertensive therapy. Baseline Pree labs were normal. If she remains well controlled off medication,   She should have serial evaluation of fetal growth every 4-6 weeks. We discussed that goal blood pressure for pregnancy based on CHAPS trial is <140/90. Antepartum fetal surveillance would only  be indicated if medical management of hypertension is necessary. Delivery timing can be planned as per ACOG recommendations.     Follow up recommended:   1.  Growth scan at 34 weeks.     Split-shared decision-making between DEISI Aquino and myself was utilized, with the majority of the time spent by OWEN Aquino.  Medical decision-making for this encounter was low (diagnosis low, data limited and risk low). .    Procedures that were completed today were charged separately.     Fouzia Russo M.D.

## 2024-05-07 NOTE — PROGRESS NOTES
OB/GYN  PN Visit  Portia Osorio  [id number]  2024  8:59 AM  Dr. Gillian Pretty MD    S: 19 y.o.  29w2d here for PN visit.       Chief Complaint   Patient presents with    Routine Prenatal Visit         OB complaints:  Contractions: no  Leakage: no  Bleeding: no  Fetal movement: yes      O:  /88 (BP Location: Right arm, Patient Position: Sitting)   Pulse (!) 110   Temp 98.4 °F (36.9 °C)   Wt 77.6 kg (171 lb)   LMP 10/17/2023 (Exact Date)   SpO2 99%   BMI 30.29 kg/m²       Review of Systems   Constitutional: Negative.    HENT: Negative.     Eyes: Negative.    Respiratory: Negative.     Cardiovascular: Negative.    Gastrointestinal: Negative.    Endocrine: Negative.    Genitourinary:         As noted in HPI   Musculoskeletal: Negative.    Skin: Negative.    Allergic/Immunologic: Negative.    Neurological: Negative.    Hematological: Negative.    Psychiatric/Behavioral: Negative.           Physical Exam  Constitutional:       General: She is not in acute distress.     Appearance: Normal appearance. She is well-developed.   Abdominal:      Palpations: Abdomen is soft.      Tenderness: There is no abdominal tenderness. There is no guarding.   Neurological:      Mental Status: She is alert and oriented to person, place, and time.   Skin:     General: Skin is warm and dry.   Psychiatric:         Behavior: Behavior normal.             Pregravid Weight/BMI: 57.2 kg (126 lb) (BMI 22.33)  Current Weight: 77.6 kg (171 lb)   Total Weight Gain: 20.4 kg (45 lb)   Pre-Summer Vitals      Flowsheet Row Most Recent Value   Prenatal Assessment    Fetal Heart Rate 145   Fundal Height (cm) 29 cm   Movement Present   Presentation Vertex   Prenatal Vitals    Blood Pressure 130/88   Weight - Scale 77.6 kg (171 lb)   Urine Albumin/Glucose    Dilation/Effacement/Station    Vaginal Drainage    Edema              Problem List          Cardiovascular and Mediastinum    Chronic hypertension affecting pregnancy     Overview     24 @ 18 wks:  /82,  repeat 130/ 82  3/5/24 @ 20 wks:  /82   148/9o with repeat of 130/72  No current meds            Behavioral Health    Marijuana use    Overview     Urine drug screen ordered: + THC (24)            Blood    Anemia during pregnancy       Obstetrics/Gynecology    29 weeks gestation of pregnancy    Supervision of normal first pregnancy, antepartum    Overview       CF/ SMA testing: declines  Flu vaccine: not completed-  declines  Covid vaccine:  completed X 2  Aneuploidy screening declined. msAFP low risk           Rubella non-immune status, antepartum    Overview     Rubella titre: borderline, consider postpartum vaccine            Other    History of atrial septal defect    Overview     Resolved by age2, no surgery required  Fetal echo normal  Maternal echo ordered           Chronic HTN - no meds  BP not elevated  Continue to monitor BPs for now  Growth US for growth  Delivery consent signed  Fetal kick counts reviewed  Follow-up in 2 weeks  Declines TDAP  Maternal echo scheduled for 10 am        Future Appointments   Date Time Provider Department Center   2024 10:00 AM OW ECHO 3 [facility]   2024  9:45 AM Margaux Gibson MD [facility]   2024  9:45 AM  [facility] [place] [facility]               Gillian Pretty MD  2024  8:59 AM

## 2024-05-09 ENCOUNTER — HOSPITAL ENCOUNTER (OUTPATIENT)
Dept: NON INVASIVE DIAGNOSTICS | Facility: HOSPITAL | Age: 19
Discharge: HOME | End: 2024-05-09
Payer: COMMERCIAL

## 2024-05-09 ENCOUNTER — ROUTINE PRENATAL (OUTPATIENT)
Age: 19
End: 2024-05-09

## 2024-05-09 VITALS
HEART RATE: 105 BPM | DIASTOLIC BLOOD PRESSURE: 88 MMHG | HEIGHT: 63 IN | SYSTOLIC BLOOD PRESSURE: 130 MMHG | BODY MASS INDEX: 30.3 KG/M2 | WEIGHT: 171 LBS

## 2024-05-09 VITALS
DIASTOLIC BLOOD PRESSURE: 88 MMHG | OXYGEN SATURATION: 99 % | BODY MASS INDEX: 30.29 KG/M2 | SYSTOLIC BLOOD PRESSURE: 130 MMHG | HEART RATE: 110 BPM | TEMPERATURE: 98.4 F | WEIGHT: 171 LBS

## 2024-05-09 DIAGNOSIS — O99.019 ANEMIA DURING PREGNANCY: ICD-10-CM

## 2024-05-09 DIAGNOSIS — Z3A.29 29 WEEKS GESTATION OF PREGNANCY: Primary | ICD-10-CM

## 2024-05-09 DIAGNOSIS — Z87.74 HISTORY OF ATRIAL SEPTAL DEFECT: ICD-10-CM

## 2024-05-09 DIAGNOSIS — O10.919 CHRONIC HYPERTENSION AFFECTING PREGNANCY: ICD-10-CM

## 2024-05-09 DIAGNOSIS — O09.899 RUBELLA NON-IMMUNE STATUS, ANTEPARTUM: ICD-10-CM

## 2024-05-09 DIAGNOSIS — Z28.39 RUBELLA NON-IMMUNE STATUS, ANTEPARTUM: ICD-10-CM

## 2024-05-09 DIAGNOSIS — Z34.00 SUPERVISION OF NORMAL FIRST PREGNANCY, ANTEPARTUM: ICD-10-CM

## 2024-05-09 DIAGNOSIS — Z34.92 SECOND TRIMESTER PREGNANCY: ICD-10-CM

## 2024-05-09 LAB
AORTIC ROOT: 2.9 CM
APICAL FOUR CHAMBER EJECTION FRACTION: 64 %
BSA FOR ECHO PROCEDURE: 1.81 M2
E WAVE DECELERATION TIME: 123 MS
E/A RATIO: 0.7
FRACTIONAL SHORTENING: 31 (ref 28–44)
INTERVENTRICULAR SEPTUM IN DIASTOLE (PARASTERNAL SHORT AXIS VIEW): 0.8 CM
INTERVENTRICULAR SEPTUM: 0.8 CM (ref 0.6–1.1)
LAAS-AP2: 11.4 CM2
LAAS-AP4: 12.5 CM2
LEFT ATRIUM SIZE: 3.4 CM
LEFT ATRIUM VOLUME (MOD BIPLANE): 27 ML
LEFT ATRIUM VOLUME INDEX (MOD BIPLANE): 14.9 ML/M2
LEFT INTERNAL DIMENSION IN SYSTOLE: 3.1 CM (ref 2.1–4)
LEFT VENTRICULAR INTERNAL DIMENSION IN DIASTOLE: 4.5 CM (ref 3.5–6)
LEFT VENTRICULAR POSTERIOR WALL IN END DIASTOLE: 0.9 CM
LEFT VENTRICULAR STROKE VOLUME: 58 ML
LVSV (TEICH): 58 ML
MAIN PULMONARY ARTERY: 1.9 CM
MV E'TISSUE VEL-SEP: 17 CM/S
MV PEAK A VEL: 0.94 M/S
MV PEAK E VEL: 66 CM/S
MV STENOSIS PRESSURE HALF TIME: 36 MS
MV VALVE AREA P 1/2 METHOD: 6.11
RIGHT ATRIUM AREA SYSTOLE A4C: 10.2 CM2
RIGHT VENTRICLE ID DIMENSION: 2.3 CM
SL CV LEFT ATRIUM LENGTH A2C: 4.2 CM
SL CV PED ECHO LEFT VENTRICLE DIASTOLIC VOLUME (MOD BIPLANE) 2D: 95 ML
SL CV PED ECHO LEFT VENTRICLE SYSTOLIC VOLUME (MOD BIPLANE) 2D: 37 ML
TR MAX PG: 23 MMHG
TR PEAK VELOCITY: 2.4 M/S
TRICUSPID ANNULAR PLANE SYSTOLIC EXCURSION: 2.2 CM
TRICUSPID VALVE PEAK REGURGITATION VELOCITY: 2.4 M/S

## 2024-05-09 PROCEDURE — 93306 TTE W/DOPPLER COMPLETE: CPT

## 2024-05-09 PROCEDURE — 93306 TTE W/DOPPLER COMPLETE: CPT | Performed by: INTERNAL MEDICINE

## 2024-05-23 ENCOUNTER — OFFICE VISIT (OUTPATIENT)
Age: 19
End: 2024-05-23

## 2024-05-23 VITALS
OXYGEN SATURATION: 100 % | HEART RATE: 102 BPM | BODY MASS INDEX: 31.54 KG/M2 | SYSTOLIC BLOOD PRESSURE: 156 MMHG | TEMPERATURE: 98.9 F | HEIGHT: 63 IN | DIASTOLIC BLOOD PRESSURE: 90 MMHG | WEIGHT: 178 LBS

## 2024-05-23 DIAGNOSIS — Z34.00 SUPERVISION OF NORMAL FIRST PREGNANCY, ANTEPARTUM: ICD-10-CM

## 2024-05-23 DIAGNOSIS — Z87.74 HISTORY OF ATRIAL SEPTAL DEFECT: ICD-10-CM

## 2024-05-23 DIAGNOSIS — Z3A.31 31 WEEKS GESTATION OF PREGNANCY: ICD-10-CM

## 2024-05-23 DIAGNOSIS — O10.919 CHRONIC HYPERTENSION AFFECTING PREGNANCY: Primary | ICD-10-CM

## 2024-05-23 PROCEDURE — PNV: Performed by: OBSTETRICS & GYNECOLOGY

## 2024-05-23 NOTE — ASSESSMENT & PLAN NOTE
Initial BP today 156/90, repeat was 148/92. She has no RUQ TTP on exam and no other concerning sx, her periumbilical pain is likely more MSK than visceral. She states a family member has a BP cuff which she can use to log BP's daily, she has not been doing so up to this point. Advised to repeat labs ASAP, she will return to clinic in 1 week to review BP's  OB precautions reviewed

## 2024-05-23 NOTE — PROGRESS NOTES
"    S: 19 y.o.  31w2d here for routine prenatal visit.        Chief Complaint   Patient presents with    Routine Prenatal Visit     Patient states she is having pain right side of stomach that has been there for about a week         OB complaints:  Contractions: no  Leakage: no  Bleeding: no  Fetal movement: yes    Persistent pain on right side adjacent to umbilicus. Denies associated N/V. Reports pain feels like an internal burning type discomfort . No pain in RUQ. No HA/visual changes/CP/SOB      O:  /90 (BP Location: Left arm, Patient Position: Sitting, Cuff Size: Adult)   Pulse 102   Temp 98.9 °F (37.2 °C) (Temporal)   Ht 5' 3\" (1.6 m)   Wt 80.7 kg (178 lb)   LMP 10/17/2023 (Exact Date)   SpO2 100%   BMI 31.53 kg/m²   Repeat /92     Review of Systems   Constitutional:  Negative for chills and fever.   Eyes:  Negative for visual disturbance.   Respiratory:  Negative for chest tightness and shortness of breath.    Cardiovascular:  Negative for chest pain.   Gastrointestinal:  Negative for abdominal pain, diarrhea, nausea and vomiting.   Genitourinary:  Negative for pelvic pain and vaginal bleeding.        As noted in HPI   Skin:  Negative for rash.   Neurological:  Negative for headaches.   All other systems reviewed and are negative.        Physical Exam  Constitutional:       General: She is not in acute distress.     Appearance: Normal appearance.   HENT:      Head: Normocephalic and atraumatic.   Cardiovascular:      Rate and Rhythm: Normal rate.   Pulmonary:      Effort: Pulmonary effort is normal. No respiratory distress.   Abdominal:      General: There is no distension.      Palpations: Abdomen is soft.      Tenderness: There is no abdominal tenderness. There is no guarding or rebound.      Comments: Gravid  No fundal or RUQ TTP    Neurological:      General: No focal deficit present.      Mental Status: She is alert.   Psychiatric:         Mood and Affect: Mood normal.         " Behavior: Behavior normal.   Vitals and nursing note reviewed.                   Pregravid Weight/BMI: 57.2 kg (126 lb) (BMI 22.33)  Current Weight: 80.7 kg (178 lb)   Total Weight Gain: 20.4 kg (45 lb)     Pre-Summer Vitals      Flowsheet Row Most Recent Value   Prenatal Assessment    Prenatal Vitals    Blood Pressure 156/90   Weight - Scale 80.7 kg (178 lb)   Urine Albumin/Glucose    Dilation/Effacement/Station    Vaginal Drainage    Edema          bpm             Problem List       31 weeks gestation of pregnancy    Supervision of normal first pregnancy, antepartum    Overview       CF/ SMA testing: declines  Flu vaccine: not completed-  declines  Covid vaccine:  completed X 2  Aneuploidy screening declined. msAFP low risk  Declines tdap   Consents signed            Current Assessment & Plan     Growth US scheduled  OB precautions reviewed          Marijuana use    Overview     Urine drug screen ordered: + THC (24)         History of atrial septal defect    Overview     Resolved by age2, no surgery required  Fetal echo normal  Maternal echo ordered - NORMAL no ASD          Rubella non-immune status, antepartum    Overview     Rubella titre: borderline, consider postpartum vaccine         Chronic hypertension affecting pregnancy    Overview     24 @ 18 wks:  /82,  repeat 130/ 82  3/5/24 @ 20 wks:  /82   148/9o with repeat of 130/72  No current meds         Current Assessment & Plan     Initial BP today 156/90, repeat was 148/92. She has no RUQ TTP on exam and no other concerning sx, her periumbilical pain is likely more MSK than visceral. She states a family member has a BP cuff which she can use to log BP's daily, she has not been doing so up to this point. Advised to repeat labs ASAP, she will return to clinic in 1 week to review BP's  OB precautions reviewed          Anemia during pregnancy                         Margaux Gibson MD  2024  10:09 AM

## 2024-05-24 ENCOUNTER — APPOINTMENT (OUTPATIENT)
Dept: LAB | Facility: HOSPITAL | Age: 19
End: 2024-05-24
Payer: COMMERCIAL

## 2024-05-24 DIAGNOSIS — O99.019 ANEMIA DURING PREGNANCY: ICD-10-CM

## 2024-05-24 DIAGNOSIS — Z34.92 SECOND TRIMESTER PREGNANCY: ICD-10-CM

## 2024-05-24 DIAGNOSIS — O10.919 CHRONIC HYPERTENSION AFFECTING PREGNANCY: ICD-10-CM

## 2024-05-24 DIAGNOSIS — R51.9 NONINTRACTABLE HEADACHE, UNSPECIFIED CHRONICITY PATTERN, UNSPECIFIED HEADACHE TYPE: ICD-10-CM

## 2024-05-24 LAB
ALBUMIN SERPL BCP-MCNC: 3.3 G/DL (ref 3.5–5)
ALP SERPL-CCNC: 87 U/L (ref 34–104)
ALT SERPL W P-5'-P-CCNC: 15 U/L (ref 7–52)
ANION GAP SERPL CALCULATED.3IONS-SCNC: 6 MMOL/L (ref 4–13)
AST SERPL W P-5'-P-CCNC: 18 U/L (ref 13–39)
BILIRUB SERPL-MCNC: 0.23 MG/DL (ref 0.2–1)
BUN SERPL-MCNC: 8 MG/DL (ref 5–25)
CALCIUM ALBUM COR SERPL-MCNC: 9.6 MG/DL (ref 8.3–10.1)
CALCIUM SERPL-MCNC: 9 MG/DL (ref 8.4–10.2)
CHLORIDE SERPL-SCNC: 106 MMOL/L (ref 96–108)
CO2 SERPL-SCNC: 24 MMOL/L (ref 21–32)
CREAT SERPL-MCNC: 0.42 MG/DL (ref 0.6–1.3)
CREAT UR-MCNC: 71.4 MG/DL
ERYTHROCYTE [DISTWIDTH] IN BLOOD BY AUTOMATED COUNT: 12.6 % (ref 11.6–15.1)
FERRITIN SERPL-MCNC: 5 NG/ML (ref 11–307)
GFR SERPL CREATININE-BSD FRML MDRD: 148 ML/MIN/1.73SQ M
GLUCOSE SERPL-MCNC: 105 MG/DL (ref 65–140)
HCT VFR BLD AUTO: 30.6 % (ref 34.8–46.1)
HGB BLD-MCNC: 10 G/DL (ref 11.5–15.4)
MCH RBC QN AUTO: 29.9 PG (ref 26.8–34.3)
MCHC RBC AUTO-ENTMCNC: 32.7 G/DL (ref 31.4–37.4)
MCV RBC AUTO: 92 FL (ref 82–98)
PLATELET # BLD AUTO: 288 THOUSANDS/UL (ref 149–390)
PMV BLD AUTO: 9.4 FL (ref 8.9–12.7)
POTASSIUM SERPL-SCNC: 3.8 MMOL/L (ref 3.5–5.3)
PROT SERPL-MCNC: 6.2 G/DL (ref 6.4–8.4)
PROT UR-MCNC: 11 MG/DL
PROT/CREAT UR: 0.15 MG/G{CREAT} (ref 0–0.1)
RBC # BLD AUTO: 3.34 MILLION/UL (ref 3.81–5.12)
SODIUM SERPL-SCNC: 136 MMOL/L (ref 135–147)
TSH SERPL DL<=0.05 MIU/L-ACNC: 0.98 UIU/ML (ref 0.45–4.5)
WBC # BLD AUTO: 8.61 THOUSAND/UL (ref 4.31–10.16)

## 2024-05-24 PROCEDURE — 82570 ASSAY OF URINE CREATININE: CPT

## 2024-05-24 PROCEDURE — 84443 ASSAY THYROID STIM HORMONE: CPT

## 2024-05-24 PROCEDURE — 82728 ASSAY OF FERRITIN: CPT

## 2024-05-24 PROCEDURE — 85027 COMPLETE CBC AUTOMATED: CPT

## 2024-05-24 PROCEDURE — 36415 COLL VENOUS BLD VENIPUNCTURE: CPT

## 2024-05-24 PROCEDURE — 80053 COMPREHEN METABOLIC PANEL: CPT

## 2024-05-24 PROCEDURE — 84156 ASSAY OF PROTEIN URINE: CPT

## 2024-05-30 ENCOUNTER — ROUTINE PRENATAL (OUTPATIENT)
Age: 19
End: 2024-05-30

## 2024-05-30 VITALS
HEART RATE: 115 BPM | TEMPERATURE: 98.4 F | DIASTOLIC BLOOD PRESSURE: 94 MMHG | BODY MASS INDEX: 31.39 KG/M2 | OXYGEN SATURATION: 98 % | SYSTOLIC BLOOD PRESSURE: 134 MMHG | WEIGHT: 177.2 LBS

## 2024-05-30 DIAGNOSIS — O10.919 CHRONIC HYPERTENSION AFFECTING PREGNANCY: Primary | ICD-10-CM

## 2024-05-30 DIAGNOSIS — Z34.00 SUPERVISION OF NORMAL FIRST PREGNANCY, ANTEPARTUM: ICD-10-CM

## 2024-05-30 DIAGNOSIS — O99.019 ANEMIA DURING PREGNANCY: ICD-10-CM

## 2024-05-30 PROBLEM — Z3A.32 32 WEEKS GESTATION OF PREGNANCY: Status: ACTIVE | Noted: 2023-12-05

## 2024-05-30 PROCEDURE — PNV: Performed by: OBSTETRICS & GYNECOLOGY

## 2024-05-30 NOTE — ASSESSMENT & PLAN NOTE
BP log at home - highest 139/94, mostly normal. Takes it in the morning and evening   Labs from last week normal   No current concerning sx for superimposed preeclampsia   Advised to continue logging 1-2x daily, she will send through [facility] in 1 week   Discussed possibility of medication if needed, delivery timing

## 2024-05-30 NOTE — PROGRESS NOTES
S: 19 y.o.  32w2d here for routine prenatal visit.        Chief Complaint   Patient presents with    Routine Prenatal Visit     BP check         OB complaints:  Contractions: no  Leakage: no  Bleeding: no  Fetal movement: yes    Denies any further RUQ pain     O:  /94 (BP Location: Left arm, Patient Position: Sitting)   Pulse (!) 115   Temp 98.4 °F (36.9 °C)   Wt 80.4 kg (177 lb 3.2 oz)   LMP 10/17/2023 (Exact Date)   SpO2 98%   BMI 31.39 kg/m²       Review of Systems   Constitutional:  Negative for chills and fever.   Eyes:  Negative for visual disturbance.   Respiratory:  Negative for chest tightness and shortness of breath.    Cardiovascular:  Negative for chest pain.   Gastrointestinal:  Negative for abdominal pain, diarrhea, nausea and vomiting.   Genitourinary:  Negative for pelvic pain and vaginal bleeding.        As noted in HPI   Skin:  Negative for rash.   Neurological:  Negative for headaches.   All other systems reviewed and are negative.        Physical Exam  Constitutional:       General: She is not in acute distress.     Appearance: Normal appearance.   HENT:      Head: Normocephalic and atraumatic.   Cardiovascular:      Rate and Rhythm: Normal rate.   Pulmonary:      Effort: Pulmonary effort is normal. No respiratory distress.   Abdominal:      General: There is no distension.      Palpations: Abdomen is soft.      Tenderness: There is no abdominal tenderness. There is no guarding or rebound.      Comments: gravid   Neurological:      General: No focal deficit present.      Mental Status: She is alert.   Psychiatric:         Mood and Affect: Mood normal.         Behavior: Behavior normal.   Vitals and nursing note reviewed.           Fundal Height (cm): 32 cm  Fetal Heart Rate: 150    Pregravid Weight/BMI: 57.2 kg (126 lb) (BMI 22.33)  Current Weight: 80.4 kg (177 lb 3.2 oz)   Total Weight Gain: 23.2 kg (51 lb 3.2 oz)     Pre- Vitals      Flowsheet Row Most Recent Value    Prenatal Assessment    Fetal Heart Rate 150   Fundal Height (cm) 32 cm   Movement Present   Prenatal Vitals    Blood Pressure 134/94   Weight - Scale 80.4 kg (177 lb 3.2 oz)   Urine Albumin/Glucose    Dilation/Effacement/Station    Vaginal Drainage    Edema                     Problem List       32 weeks gestation of pregnancy    Supervision of normal first pregnancy, antepartum    Overview       CF/ SMA testing: declines  Flu vaccine: not completed-  declines  Covid vaccine:  completed X 2  Aneuploidy screening declined. msAFP low risk  Declines tdap   Consents signed            Current Assessment & Plan     OB precautions reviewed         Marijuana use    Overview     Urine drug screen ordered: + THC (1/25/24)         History of atrial septal defect    Overview     Resolved by age2, no surgery required  Fetal echo normal  Maternal echo ordered - NORMAL no ASD          Rubella non-immune status, antepartum    Overview     Rubella titre: borderline, consider postpartum vaccine         Chronic hypertension affecting pregnancy    Overview     2/22/24 @ 18 wks:  /82,  repeat 130/ 82  3/5/24 @ 20 wks:  /82  4/29 148/90 with repeat of 130/72  No current meds         Current Assessment & Plan     BP log at home - highest 139/94, mostly normal. Takes it in the morning and evening   Labs from last week normal   No current concerning sx for superimposed preeclampsia   Advised to continue logging 1-2x daily, she will send through [employer] in 1 week   Discussed possibility of medication if needed, delivery timing           Anemia during pregnancy    Current Assessment & Plan     She is taking iron twice daily                               Margaux Gibson MD  5/30/2024  9:04 AM

## 2024-06-05 ENCOUNTER — PATIENT MESSAGE (OUTPATIENT)
Age: 19
End: 2024-06-05

## 2024-06-06 DIAGNOSIS — O10.919 CHRONIC HYPERTENSION AFFECTING PREGNANCY: Primary | ICD-10-CM

## 2024-06-06 RX ORDER — NIFEDIPINE 30 MG/1
30 TABLET, EXTENDED RELEASE ORAL DAILY
Qty: 30 TABLET | Refills: 1 | Status: SHIPPED | OUTPATIENT
Start: 2024-06-06

## 2024-06-06 NOTE — PATIENT COMMUNICATION
BP log reviewed. Called and spoke with pt. She has no OB complaints, no HA/visual changes/CP/SOB or other concerning sx. Advised given uptrending BP's would recommend antihypertensive therapy, Procardia XL 30 mg ordered. Advised to repeat labwork soon as well. She has a f/u appt in 2 weeks. Precautions reviewed

## 2024-06-11 ENCOUNTER — ULTRASOUND (OUTPATIENT)
Dept: PERINATAL CARE | Facility: OTHER | Age: 19
End: 2024-06-11
Payer: COMMERCIAL

## 2024-06-11 VITALS
HEIGHT: 63 IN | WEIGHT: 184 LBS | HEART RATE: 109 BPM | DIASTOLIC BLOOD PRESSURE: 62 MMHG | SYSTOLIC BLOOD PRESSURE: 110 MMHG | BODY MASS INDEX: 32.6 KG/M2

## 2024-06-11 DIAGNOSIS — Z3A.34 34 WEEKS GESTATION OF PREGNANCY: ICD-10-CM

## 2024-06-11 DIAGNOSIS — O10.919 CHRONIC HYPERTENSION AFFECTING PREGNANCY: Primary | ICD-10-CM

## 2024-06-11 PROCEDURE — 76816 OB US FOLLOW-UP PER FETUS: CPT | Performed by: OBSTETRICS & GYNECOLOGY

## 2024-06-11 PROCEDURE — 59025 FETAL NON-STRESS TEST: CPT | Performed by: OBSTETRICS & GYNECOLOGY

## 2024-06-11 PROCEDURE — 99212 OFFICE O/P EST SF 10 MIN: CPT | Performed by: OBSTETRICS & GYNECOLOGY

## 2024-06-11 NOTE — PROGRESS NOTES
Non-Stress Testing:    Non-Stress test, equipment, procedure, and expected outcomes explained. Reviewed fetal kick counts and when to call OB.Verified patient understanding of fetal kick counts with teach back method. Patient reports feeling daily fetal movements. Patient has no questions or concerns.   NST reviewed by Dr. Ravi

## 2024-06-20 ENCOUNTER — APPOINTMENT (OUTPATIENT)
Dept: LAB | Facility: HOSPITAL | Age: 19
End: 2024-06-20
Payer: COMMERCIAL

## 2024-06-20 ENCOUNTER — ULTRASOUND (OUTPATIENT)
Dept: PERINATAL CARE | Facility: OTHER | Age: 19
End: 2024-06-20
Payer: COMMERCIAL

## 2024-06-20 ENCOUNTER — ROUTINE PRENATAL (OUTPATIENT)
Age: 19
End: 2024-06-20

## 2024-06-20 VITALS
SYSTOLIC BLOOD PRESSURE: 136 MMHG | HEART RATE: 117 BPM | TEMPERATURE: 98.5 F | DIASTOLIC BLOOD PRESSURE: 92 MMHG | WEIGHT: 187 LBS | BODY MASS INDEX: 33.13 KG/M2 | HEIGHT: 63 IN | OXYGEN SATURATION: 98 %

## 2024-06-20 VITALS
SYSTOLIC BLOOD PRESSURE: 122 MMHG | HEIGHT: 63 IN | BODY MASS INDEX: 33.24 KG/M2 | WEIGHT: 187.6 LBS | DIASTOLIC BLOOD PRESSURE: 78 MMHG | HEART RATE: 93 BPM

## 2024-06-20 DIAGNOSIS — O10.919 CHRONIC HYPERTENSION AFFECTING PREGNANCY: Primary | ICD-10-CM

## 2024-06-20 DIAGNOSIS — O10.919 CHRONIC HYPERTENSION AFFECTING PREGNANCY: ICD-10-CM

## 2024-06-20 DIAGNOSIS — Z3A.35 35 WEEKS GESTATION OF PREGNANCY: ICD-10-CM

## 2024-06-20 DIAGNOSIS — Z34.00 SUPERVISION OF NORMAL FIRST PREGNANCY, ANTEPARTUM: ICD-10-CM

## 2024-06-20 LAB
ALBUMIN SERPL BCG-MCNC: 3.2 G/DL (ref 3.5–5)
ALP SERPL-CCNC: 135 U/L (ref 34–104)
ALT SERPL W P-5'-P-CCNC: 13 U/L (ref 7–52)
ANION GAP SERPL CALCULATED.3IONS-SCNC: 7 MMOL/L (ref 4–13)
AST SERPL W P-5'-P-CCNC: 21 U/L (ref 13–39)
BILIRUB SERPL-MCNC: 0.29 MG/DL (ref 0.2–1)
BUN SERPL-MCNC: 6 MG/DL (ref 5–25)
CALCIUM ALBUM COR SERPL-MCNC: 9.8 MG/DL (ref 8.3–10.1)
CALCIUM SERPL-MCNC: 9.2 MG/DL (ref 8.4–10.2)
CHLORIDE SERPL-SCNC: 107 MMOL/L (ref 96–108)
CO2 SERPL-SCNC: 23 MMOL/L (ref 21–32)
CREAT SERPL-MCNC: 0.49 MG/DL (ref 0.6–1.3)
CREAT UR-MCNC: 43.9 MG/DL
ERYTHROCYTE [DISTWIDTH] IN BLOOD BY AUTOMATED COUNT: 13.1 % (ref 11.6–15.1)
GFR SERPL CREATININE-BSD FRML MDRD: 140 ML/MIN/1.73SQ M
GLUCOSE P FAST SERPL-MCNC: 68 MG/DL (ref 65–99)
HCT VFR BLD AUTO: 32.5 % (ref 34.8–46.1)
HGB BLD-MCNC: 10.6 G/DL (ref 11.5–15.4)
MCH RBC QN AUTO: 28.9 PG (ref 26.8–34.3)
MCHC RBC AUTO-ENTMCNC: 32.6 G/DL (ref 31.4–37.4)
MCV RBC AUTO: 89 FL (ref 82–98)
PLATELET # BLD AUTO: 315 THOUSANDS/UL (ref 149–390)
PMV BLD AUTO: 9.8 FL (ref 8.9–12.7)
POTASSIUM SERPL-SCNC: 3.9 MMOL/L (ref 3.5–5.3)
PROT SERPL-MCNC: 6.3 G/DL (ref 6.4–8.4)
PROT UR-MCNC: 5 MG/DL
PROT/CREAT UR: 0.11 MG/G{CREAT} (ref 0–0.1)
RBC # BLD AUTO: 3.67 MILLION/UL (ref 3.81–5.12)
SODIUM SERPL-SCNC: 137 MMOL/L (ref 135–147)
WBC # BLD AUTO: 7.22 THOUSAND/UL (ref 4.31–10.16)

## 2024-06-20 PROCEDURE — 76815 OB US LIMITED FETUS(S): CPT | Performed by: NURSE PRACTITIONER

## 2024-06-20 PROCEDURE — 99213 OFFICE O/P EST LOW 20 MIN: CPT | Performed by: NURSE PRACTITIONER

## 2024-06-20 PROCEDURE — 80053 COMPREHEN METABOLIC PANEL: CPT

## 2024-06-20 PROCEDURE — 85027 COMPLETE CBC AUTOMATED: CPT

## 2024-06-20 PROCEDURE — 84156 ASSAY OF PROTEIN URINE: CPT

## 2024-06-20 PROCEDURE — PNV: Performed by: OBSTETRICS & GYNECOLOGY

## 2024-06-20 PROCEDURE — 59025 FETAL NON-STRESS TEST: CPT | Performed by: NURSE PRACTITIONER

## 2024-06-20 PROCEDURE — 82570 ASSAY OF URINE CREATININE: CPT

## 2024-06-20 PROCEDURE — 36415 COLL VENOUS BLD VENIPUNCTURE: CPT

## 2024-06-20 RX ORDER — LABETALOL 200 MG/1
200 TABLET, FILM COATED ORAL 3 TIMES DAILY
Qty: 90 TABLET | Refills: 0 | Status: SHIPPED | OUTPATIENT
Start: 2024-06-20 | End: 2024-06-27

## 2024-06-20 NOTE — PROGRESS NOTES
"    S: 19 y.o.  35w2d here for routine prenatal visit.        Chief Complaint   Patient presents with    Routine Prenatal Visit     No issues or concerns         OB complaints:  Contractions: no  Leakage: no  Bleeding: no  Fetal movement: yes      O:  /92   Pulse (!) 117   Temp 98.5 °F (36.9 °C) (Temporal)   Ht 5' 3\" (1.6 m)   Wt 84.8 kg (187 lb)   LMP 10/17/2023 (Exact Date)   SpO2 98%   BMI 33.13 kg/m²       Review of Systems   Constitutional:  Negative for chills and fever.   Eyes:  Negative for visual disturbance.   Respiratory:  Negative for chest tightness and shortness of breath.    Cardiovascular:  Negative for chest pain.   Gastrointestinal:  Negative for abdominal pain, diarrhea, nausea and vomiting.   Genitourinary:  Negative for pelvic pain and vaginal bleeding.        As noted in HPI   Skin:  Negative for rash.   Neurological:  Negative for headaches.   All other systems reviewed and are negative.        Physical Exam  Constitutional:       General: She is not in acute distress.     Appearance: Normal appearance.   HENT:      Head: Normocephalic and atraumatic.   Cardiovascular:      Rate and Rhythm: Normal rate.   Pulmonary:      Effort: Pulmonary effort is normal. No respiratory distress.   Abdominal:      General: There is no distension.      Palpations: Abdomen is soft.      Tenderness: There is no abdominal tenderness. There is no guarding or rebound.      Comments: gravid   Neurological:      General: No focal deficit present.      Mental Status: She is alert.   Psychiatric:         Mood and Affect: Mood normal.         Behavior: Behavior normal.   Vitals and nursing note reviewed.           Fundal Height (cm): 36 cm  Fetal Heart Rate: 155    Pregravid Weight/BMI: 57.2 kg (126 lb) (BMI 22.33)  Current Weight: 84.8 kg (187 lb)   Total Weight Gain: 27.7 kg (61 lb)     Pre-Summer Vitals      Flowsheet Row Most Recent Value   Prenatal Assessment    Fetal Heart Rate 155   Fundal " Height (cm) 36 cm   Movement Present   Prenatal Vitals    Blood Pressure 136/92   Weight - Scale 84.8 kg (187 lb)   Urine Albumin/Glucose    Dilation/Effacement/Station    Vaginal Drainage    Edema                     Problem List       35 weeks gestation of pregnancy    Supervision of normal first pregnancy, antepartum    Overview       CF/ SMA testing: declines  Flu vaccine: not completed-  declines  Covid vaccine:  completed X 2  Aneuploidy screening declined. msAFP low risk  Declines tdap   Consents signed            Marijuana use    Overview     Urine drug screen ordered: + THC (1/25/24)         History of atrial septal defect    Overview     Resolved by age2, no surgery required  Fetal echo normal  Maternal echo ordered - NORMAL no ASD          Rubella non-immune status, antepartum    Overview     Rubella titre: borderline, consider postpartum vaccine         Chronic hypertension affecting pregnancy    Overview     No diagnosis or meds prior to pregnancy   2/22/24 @ 18 wks:  /82,  repeat 130/ 82  3/5/24 @ 20 wks:  /82  4/29 148/90 with repeat of 130/72  EFW 34 weeks 80th%  Home BP log at 32-33 weeks: 120's-130's/90's-100's, Procardia XL 30 mg started   Due to headaches after taking procardia switched to labetalol 200 mg TID          Current Assessment & Plan     She reports since starting procardia she will get a headache within a few hours of taking. No associated visual changes, CP/SOB/N/V/RUQ pain. Headache lingers but is improved with tylenol.   Today she reports no current headache. She did take her medication this morning  Review of home BP's are mostly within target range - 120's-130's/70's-80's with occasional 140's/90's   She has not obtained labs from last visit  Advised obtaining labs ASAP, she has MFM appt this afternoon for NST/PATTY  Given onset of headaches after starting procardia will switch to labetalol - advised to take first dose this afternoon   Strict precautions reviewed           Anemia during pregnancy                         Margaux Gibson MD  6/20/2024  9:24 AM

## 2024-06-20 NOTE — PROGRESS NOTES
"[facility]: Ms. Osorio was seen today at 35w2d gestational age for NST (found under the pregnancy episode) which I reviewed the RN assessment and agree, and PATTY (see ultrasound report under OB procedures tab).  See ultrasound report under \"OB Procedures\" tab.    Manda LIPSCOMB    I spent 10 minutes devoted to patient care (3 min chart preparation, 4 minutes face to face and 3 minutes documenting).    "
Repeat Non-Stress Testing:    Patient verbalizes +FM. Pt denies ALL:               Leaking of fluid   Contractions   Vaginal bleeding   Decreased fetal movement    Patient is performing daily kick counts. Patient has no questions or concerns.   NST strip reviewed by DEISI Dove.     
Yes...
none

## 2024-06-20 NOTE — ASSESSMENT & PLAN NOTE
She reports since starting procardia she will get a headache within a few hours of taking. No associated visual changes, CP/SOB/N/V/RUQ pain. Headache lingers but is improved with tylenol.   Today she reports no current headache. She did take her medication this morning  Review of home BP's are mostly within target range - 120's-130's/70's-80's with occasional 140's/90's   She has not obtained labs from last visit  Advised obtaining labs ASAP, she has MFM appt this afternoon for NST/PATTY  Given onset of headaches after starting procardia will switch to labetalol - advised to take first dose this afternoon   Strict precautions reviewed

## 2024-06-21 ENCOUNTER — NURSE TRIAGE (OUTPATIENT)
Dept: OTHER | Facility: OTHER | Age: 19
End: 2024-06-21

## 2024-06-22 NOTE — TELEPHONE ENCOUNTER
"Regardin weeks pregnant/ /46  ----- Message from Citlaly DO sent at 2024  8:00 PM EDT -----  \"I am 35 weeks pregnant they changed my BP medication and I am not sure if my readings are to low \"    "

## 2024-06-22 NOTE — TELEPHONE ENCOUNTER
"recently had a change in her blood pressure medication. Patient started Labetalol 200mg TID today. She has taken two doses thus far. Blood pressures this evening have been 105/47 and 108/43. Denies any symptoms however notes her blood pressures previously was in the 130s/90s. concerned if she should still take her third dose of Labetalol for the day     On call provider notified  Reason for Disposition   [1] Systolic BP  AND [2] taking blood pressure medications AND [3] NOT dizzy, lightheaded or weak    Answer Assessment - Initial Assessment Questions  1. BLOOD PRESSURE: \"What is the blood pressure?\" \"Did you take at least two measurements 5 minutes apart?\"      105/47 and 108/43  2. ONSET: \"When did you take your blood pressure?\"      This evening  3. HOW: \"How did you obtain the blood pressure?\" (e.g., visiting nurse, automatic home BP monitor)      Home BP cuff  5. MEDICATIONS: \"Are you taking any medications for blood pressure?\" If Yes, ask: \"Have they been changed recently?\"      Labetalol  7. OTHER SYMPTOMS: \"Have you been sick recently?\" \"Have you had a recent injury?\"      denies  8. PREGNANCY: \"Is there any chance you are pregnant?\" \"When was your last menstrual period?\"      35w3d    Protocols used: Blood Pressure - Low-ADULT-AH    "

## 2024-06-22 NOTE — TELEPHONE ENCOUNTER
Per on call-  Do not take third dose of Labetalol tonight. Continue to monitor blood pressure    Patient was advised and verbalized understanding of the above provider recommendations.

## 2024-06-27 ENCOUNTER — ROUTINE PRENATAL (OUTPATIENT)
Age: 19
End: 2024-06-27

## 2024-06-27 ENCOUNTER — ULTRASOUND (OUTPATIENT)
Dept: PERINATAL CARE | Facility: OTHER | Age: 19
End: 2024-06-27
Payer: COMMERCIAL

## 2024-06-27 VITALS
SYSTOLIC BLOOD PRESSURE: 128 MMHG | HEART RATE: 102 BPM | OXYGEN SATURATION: 99 % | WEIGHT: 189.6 LBS | TEMPERATURE: 97.8 F | DIASTOLIC BLOOD PRESSURE: 88 MMHG | BODY MASS INDEX: 33.59 KG/M2

## 2024-06-27 VITALS
SYSTOLIC BLOOD PRESSURE: 132 MMHG | HEIGHT: 63 IN | HEART RATE: 100 BPM | DIASTOLIC BLOOD PRESSURE: 76 MMHG | WEIGHT: 188.6 LBS | BODY MASS INDEX: 33.42 KG/M2

## 2024-06-27 DIAGNOSIS — O10.919 CHRONIC HYPERTENSION AFFECTING PREGNANCY: Primary | ICD-10-CM

## 2024-06-27 DIAGNOSIS — Z3A.36 36 WEEKS GESTATION OF PREGNANCY: ICD-10-CM

## 2024-06-27 DIAGNOSIS — Z34.00 SUPERVISION OF NORMAL FIRST PREGNANCY, ANTEPARTUM: ICD-10-CM

## 2024-06-27 DIAGNOSIS — O10.919 CHRONIC HYPERTENSION AFFECTING PREGNANCY: ICD-10-CM

## 2024-06-27 DIAGNOSIS — Z3A.36 36 WEEKS GESTATION OF PREGNANCY: Primary | ICD-10-CM

## 2024-06-27 PROCEDURE — 87150 DNA/RNA AMPLIFIED PROBE: CPT | Performed by: OBSTETRICS & GYNECOLOGY

## 2024-06-27 PROCEDURE — PNV: Performed by: OBSTETRICS & GYNECOLOGY

## 2024-06-27 PROCEDURE — 76815 OB US LIMITED FETUS(S): CPT | Performed by: OBSTETRICS & GYNECOLOGY

## 2024-06-27 PROCEDURE — 59025 FETAL NON-STRESS TEST: CPT | Performed by: OBSTETRICS & GYNECOLOGY

## 2024-06-27 RX ORDER — LABETALOL 200 MG/1
200 TABLET, FILM COATED ORAL 2 TIMES DAILY
Start: 2024-06-27

## 2024-06-27 NOTE — ASSESSMENT & PLAN NOTE
BP's are within target range and she has no current concerning sx  Will continue labetalol at 200 mg BID   IOL scheduled for 38 weeks, reviewed expectations  GBS collected, SVE deferred given lack of complaints  OB precautions reviewed

## 2024-06-27 NOTE — PATIENT INSTRUCTIONS
Induction of Labor - scheduled Weds 7/10 8pm     What is Induction of Labor?  Induction of labor is when labor is started (induced) before it begins on its own. Medicines and other methods are used to start contractions and help your cervix soften, thin (efface), and dilate (open). You may be given antibiotics to prevent an infection during delivery.    When can you have an elective induction?  Before a decision can be made for an elective induction of labor, you healthcare provider must assess the following information about your pregnancy:  Due date  At least 39 weeks pregnant  Is your cervix showing signs of being ready for labor?    How is labor induced?  There are several ways to induce labor. Your doctor will choose what's best for you.   Breaking your water  The doctor uses a plastic hook to make a small hole in the amniotic sac (bag of water)  Wilder Balloon  This is a small catheter with a balloon on the end which is inserted into the vagina. The balloon is inflated with saline to help the cervix expand. The intention is to mechanically dilate the cervix to a point where it is safe to start Pitocin. You may be out of bed with this method. In fact it is encouraged since gravity will assist in moving the balloon down through your cervix.    Medications  Cytotec/prostaglandins  This is used when your cervix is still closed and thick. It is a small tablet inserted into your vagina by the physician, or it can be taken orally/buccally. This tablet may be given every 4-6 hours as needed until your cervix opens and thins. After the medication is placed you will be on the fetal heart monitor for a period of time. Whether you are on intermittent monitoring and are permitted to walk/sit in a chair versus continuous monitoring will be determined by your clinical status.   Pitocin  This is an IV medication that is administered slowly through a pump and increased in small increments until a productive pattern of labor is  "achieved. The goal is to cause your contractions to begin and to stay strong and regular. Your baby will need to be constantly monitored on this medication. You may either be in the bed, a chair or on the birthing ball, as long as the baby's heartrate can be monitored safely.     What are the risks of an induction?  You may have a longer labor.  Medicines used to induce labor may cause too many contractions. This can lower your baby's heartbeat and decrease his or her oxygen supply. Induction of labor may also increase the risk of umbilical cord prolapse. This condition causes the umbilical cord to slip into the vagina before delivery. It can compress the cord and decrease your baby's oxygen supply. If this were to happen, you will need a           Preparing for your Induction      When you are scheduled for an overnight induction, it is most likely because your cervix is closed or \"unfavorable\". Your body is not ready for labor and therefore you may need medication overnight to prepare your cervix for the Pitocin in the morning. This type of induction can take a day, or more of labor. This is normal. We are asking  your body do something it is not quite ready to do yet.   You may eat regular meals prior to when you arrive for your induction. Once you arrive at the hospital you will be on a clear liquid diet until you deliver the baby. That will consist of juices (not orange juice), soda, broths, popsicles, Jello and water ice.   Please be prepared for an induction to take a day or two or even more until you deliver your baby. As mentioned earlier, we are asking your body to do something it is not quite ready to do yet.     "

## 2024-06-27 NOTE — PROGRESS NOTES
Repeat Non-Stress Testing:    Patient verbalizes +FM. Pt denies ALL:               Leaking of fluid   Contractions   Vaginal bleeding   Decreased fetal movement    Patient is performing daily kick counts. Patient has no questions or concerns.   NST strip reviewed by Dr. Ravi.

## 2024-06-27 NOTE — PATIENT INSTRUCTIONS
Thank you for choosing us for your  care today.  If you have any questions about your ultrasound or care, please do not hesitate to contact us or your primary obstetrician.        Some general instructions for your pregnancy are:    Exercise: Aim for 150 minutes per week of regular exercise.  Walking is great!  Nutrition: Choose healthy sources of calcium, iron, and protein.  Avoid ultraprocessed foods and added sugar.  Learn about Preeclampsia: preeclampsia is a common, potentially serious high blood pressure complication in pregnancy.  A blood pressure of 140mmHg (systolic or top number) or 90mmHg (diastolic or bottom number) should be evaluated by your doctor.  Aspirin is sometimes prescribed in early pregnancy to prevent preeclampsia in women with risk factors - ask your obstetrician if you should be on this medication.  For more resources, visit:  https://www.highriskpregnancyinfo.org/preeclampsia  If you smoke, please try to quit completely but also try to reduce your smoking by as much as possible (as soon as possible).  Do not vape.  Please also avoid cannabis products.  Other warning signs to watch out for in pregnancy or postpartum: chest pain, obstructed breathing or shortness of breath, seizures, thoughts of hurting yourself or your baby, bleeding, a painful or swollen leg, fever, or headache (see [facility] Sign[facility]).  If these happen call 911.  Itching is also not normal in pregnancy and if you experience this, especially over your hands and feet, potentially worse at night, notify your doctors.     Thank you for choosing us for your  care today.  If you have any questions about your ultrasound or care, please do not hesitate to contact us or your primary obstetrician.        Some general instructions for your pregnancy are:    Exercise: Aim for 150 minutes per week of regular exercise.  Walking is great!  Nutrition: Choose healthy sources of calcium, iron, and  protein.  Avoid ultraprocessed foods and added sugar.  Learn about Preeclampsia: preeclampsia is a common, potentially serious high blood pressure complication in pregnancy.  A blood pressure of 140mmHg (systolic or top number) or 90mmHg (diastolic or bottom number) should be evaluated by your doctor.  Aspirin is sometimes prescribed in early pregnancy to prevent preeclampsia in women with risk factors - ask your obstetrician if you should be on this medication.  For more resources, visit:  https://www.highriskpregnancyinfo.org/preeclampsia  If you smoke, please try to quit completely but also try to reduce your smoking by as much as possible (as soon as possible).  Do not vape.  Please also avoid cannabis products.  Other warning signs to watch out for in pregnancy or postpartum: chest pain, obstructed breathing or shortness of breath, seizures, thoughts of hurting yourself or your baby, bleeding, a painful or swollen leg, fever, or headache (see [facility]).  If these happen call 911.  Itching is also not normal in pregnancy and if you experience this, especially over your hands and feet, potentially worse at night, notify your doctors.

## 2024-06-27 NOTE — PROGRESS NOTES
S: 19 y.o.  36w2d here for routine prenatal visit.        Chief Complaint   Patient presents with    Routine Prenatal Visit     No issues or concerns         OB complaints:  Contractions: no  Leakage: no  Bleeding: no  Fetal movement: yes    Had NST/PATTY today at [facility], reassuring. Has weekly appts set up   Switched from procardia to labetaelol 200 mg TID last visit due to new onset headaches - she called answering service next day due to low BP's 100's/40's and was advised to hold third dose   Now taking twice daily - mostly 120's70's, no further LH/dizziness/headaches         O:  /88 (BP Location: Left arm, Patient Position: Sitting)   Pulse 102   Temp 97.8 °F (36.6 °C)   Wt 86 kg (189 lb 9.6 oz)   LMP 10/17/2023 (Exact Date)   SpO2 99%   BMI 33.59 kg/m²       Review of Systems   Constitutional:  Negative for chills and fever.   Eyes:  Negative for visual disturbance.   Respiratory:  Negative for chest tightness and shortness of breath.    Cardiovascular:  Negative for chest pain.   Gastrointestinal:  Negative for abdominal pain, diarrhea, nausea and vomiting.   Genitourinary:  Negative for pelvic pain and vaginal bleeding.        As noted in HPI   Skin:  Negative for rash.   Neurological:  Negative for headaches.   All other systems reviewed and are negative.        Physical Exam  Constitutional:       General: She is not in acute distress.     Appearance: Normal appearance.   Genitourinary:      Right Labia: No rash, tenderness, lesions or skin changes.     Left Labia: No tenderness, lesions, skin changes or rash.     Pelvic exam was performed with patient in the lithotomy position.   HENT:      Head: Normocephalic and atraumatic.   Cardiovascular:      Rate and Rhythm: Normal rate.   Pulmonary:      Effort: Pulmonary effort is normal. No respiratory distress.   Abdominal:      General: There is no distension.      Palpations: Abdomen is soft.      Tenderness: There is no abdominal  tenderness. There is no guarding or rebound.      Comments: gravid   Neurological:      General: No focal deficit present.      Mental Status: She is alert.   Psychiatric:         Mood and Affect: Mood normal.         Behavior: Behavior normal.   Vitals and nursing note reviewed. Exam conducted with a chaperone present.                   Pregravid Weight/BMI: 57.2 kg (126 lb) (BMI 22.33)  Current Weight: 86 kg (189 lb 9.6 oz)   Total Weight Gain: 28.8 kg (63 lb 9.6 oz)     Pre-Summer Vitals      Flowsheet Row Most Recent Value   Prenatal Assessment    Prenatal Vitals    Blood Pressure 128/88   Weight - Scale 86 kg (189 lb 9.6 oz)   Urine Albumin/Glucose    Dilation/Effacement/Station    Vaginal Drainage    Edema                     Problem List       36 weeks gestation of pregnancy    Supervision of normal first pregnancy, antepartum    Overview       CF/ SMA testing: declines  Flu vaccine: not completed-  declines  Covid vaccine:  completed X 2  Aneuploidy screening declined. msAFP low risk  Declines tdap   Consents signed            Marijuana use    Overview     Urine drug screen ordered: + THC (24)         History of atrial septal defect    Overview     Resolved by age2, no surgery required  Fetal echo normal  Maternal echo ordered - NORMAL no ASD          Rubella non-immune status, antepartum    Overview     Rubella titre: borderline, consider postpartum vaccine         Chronic hypertension affecting pregnancy    Overview     No diagnosis or meds prior to pregnancy   24 @ 18 wks:  /82,  repeat 130/ 82  3/5/24 @ 20 wks:  /82   148/90 with repeat of 130/72  EFW 34 weeks 80th%  Home BP log at 32-33 weeks: 120's-130's/90's-100's, Procardia XL 30 mg started   Due to headaches after taking procardia switched to labetalol 200 mg BID  IOL scheduled for 38 weeks          Current Assessment & Plan     BP's are within target range and she has no current concerning sx  Will continue labetalol at 200  mg BID   IOL scheduled for 38 weeks, reviewed expectations  GBS collected, SVE deferred given lack of complaints  OB precautions reviewed         Anemia during pregnancy                         Margaux Gibson MD  6/27/2024  11:26 AM

## 2024-06-29 LAB — GP B STREP DNA SPEC QL NAA+PROBE: NEGATIVE

## 2024-06-30 PROBLEM — Z3A.38 38 WEEKS GESTATION OF PREGNANCY: Status: ACTIVE | Noted: 2023-12-05

## 2024-06-30 PROBLEM — Z3A.37 37 WEEKS GESTATION OF PREGNANCY: Status: ACTIVE | Noted: 2023-12-05

## 2024-06-30 NOTE — PROGRESS NOTES
"OB/GYN  PN Visit  Portia Gonzalez  [id number]  2024  12:50 PM  Dr. Gillian Pretty MD    S: 19 y.o.  36  here for PN visit.       Chief Complaint   Patient presents with    Routine Prenatal Visit         OB complaints:  Contractions: no  Leakage: no  Bleeding: no  Fetal movement: yes      O:  /76   Pulse 100   Ht 5' 3\" (1.6 m)   Wt 86.2 kg (190 lb)   LMP 10/17/2023 (Exact Date)   BMI 33.66 kg/m²       Review of Systems   Constitutional: Negative.    HENT: Negative.     Eyes: Negative.    Respiratory: Negative.     Cardiovascular: Negative.    Gastrointestinal: Negative.    Endocrine: Negative.    Genitourinary:         As noted in HPI   Musculoskeletal: Negative.    Skin: Negative.    Allergic/Immunologic: Negative.    Neurological: Negative.    Hematological: Negative.    Psychiatric/Behavioral: Negative.           Physical Exam  Constitutional:       General: She is not in acute distress.     Appearance: Normal appearance. She is well-developed.   Abdominal:      Palpations: Abdomen is soft.      Tenderness: There is no abdominal tenderness. There is no guarding.   Neurological:      Mental Status: She is alert and oriented to person, place, and time.   Skin:     General: Skin is warm and dry.   Psychiatric:         Behavior: Behavior normal.             Pregravid Weight/BMI: 57.2 kg (126 lb) (BMI 22.33)  Current Weight: 86.2 kg (190 lb)   Total Weight Gain: 29 kg (64 lb)   Pre-Summer Vitals      Flowsheet Row Most Recent Value   Prenatal Assessment    Fetal Heart Rate 136   Movement Present   Prenatal Vitals    Blood Pressure 128/76   Weight - Scale 86.2 kg (190 lb)   Urine Albumin/Glucose    Dilation/Effacement/Station    Vaginal Drainage    Edema              Problem List          Cardiovascular and Mediastinum    Chronic hypertension affecting pregnancy    Overview     No diagnosis or meds prior to pregnancy   24 @ 18 wks:  /82,  repeat 130/ 82  3/5/24 @ 20 wks:  /82   " 148/90 with repeat of 130/72  EFW 34 weeks 80th%  Home BP log at 32-33 weeks: 120's-130's/90's-100's, Procardia XL 30 mg started   Due to headaches after taking procardia switched to labetalol 200 mg BID  IOL scheduled for 38 weeks             Behavioral Health    Marijuana use    Overview     Urine drug screen ordered: + THC (24)            Blood    Anemia during pregnancy       Obstetrics/Gynecology    37 weeks gestation of pregnancy    Supervision of normal first pregnancy, antepartum    Overview       CF/ SMA testing: declines  Flu vaccine: not completed-  declines  Covid vaccine:  completed X 2  Aneuploidy screening declined. msAFP low risk  Declines tdap   Consents signed            Rubella non-immune status, antepartum    Overview     Rubella titre: borderline, consider postpartum vaccine            Other    History of atrial septal defect    Overview     Resolved by age2, no surgery required  Fetal echo normal  Maternal echo ordered - NORMAL no ASD           Other Visit Diagnoses       Third trimester pregnancy    -  Primary           Induction consent signed  She will continue weekly testing until delivery.  She is scheduled for next week.  She declines cervical examination today  Follow-up next week for routine prenatal visit      Future Appointments   Date Time Provider Department Center   2024  3:15 PM  NURSE JANNY [address] [place] [address]   2024  4:00 PM Margaux Gibson MD Complete  [facility]   7/10/2024  8:00 PM [facility] ROOM [facility]               Gillian Pretty MD  2024  12:50 PM

## 2024-06-30 NOTE — PATIENT INSTRUCTIONS
Patient Education     High blood pressure and pregnancy   The Basics   Written by the doctors and editors at [facility]   Can people with high blood pressure have a normal pregnancy? -- Yes. Most people with high blood pressure before pregnancy will have a normal pregnancy. But people with high blood pressure before pregnancy have a higher chance of having certain problems when they are pregnant. These might include:   Preeclampsia - People with preeclampsia have high blood pressure and too much protein in their urine or problems with certain organs. Preeclampsia usually happens during the second half of pregnancy, and can be a dangerous condition. It can cause problems with a baby's growth in the mother's uterus. It can also affect the mother's liver, kidneys, blood, heart, eyes, and nervous system.   Placental abruption - During pregnancy, the placenta is the organ that brings the baby nutrients and oxygen, and carries away waste. It is attached to the inside wall of the uterus. A placental abruption is when part or all of the placenta separates from the uterus before the baby is born. If this happens, the baby might not get enough nutrients and oxygen.   Slowed growth of the baby - The baby can be small and not grow normally.  What should I do before trying to get pregnant? -- Talk with your doctor. If you are taking blood pressure medicine and your blood pressure is well managed, your doctor might want to switch you to a different medicine that is safer during pregnancy.  If your blood pressure is not well managed, your doctor will work with you to treat it.  Will I have tests during pregnancy? -- Yes. At each visit, the doctor or nurse will check your blood pressure and your baby's growth. You will also have tests to check your baby's health at different times during pregnancy.  Will I need to take medicine during pregnancy? -- It depends on how high your blood pressure is.  When your doctor or nurse tells you  "your blood pressure, they will say 2 numbers. For instance, they might say that your blood pressure is \"130 over 80.\" Your doctor will recommend medicine if:   Your \"systolic\" blood pressure (the top number) is 140 or higher, or   Your \"diastolic\" blood pressure (the bottom number) is 90 or higher.  If you do start blood pressure medicine or switch to a new medicine, your doctor will make sure that it is safe to take during pregnancy.  Your doctor might also tell you to take low-dose aspirin during your second and third trimesters of pregnancy (after 12 weeks). This can lower your risk of preeclampsia. Do not take aspirin, or any other medicines, unless your doctor or nurse tells you that it's safe.  What symptoms should I watch for during pregnancy? -- Your doctor or nurse will talk to you about how to recognize signs of preeclampsia or placental abruption, or other problems, during pregnancy.  Call your doctor or nurse right away if:   You don't feel your baby move as much as usual.   You start having contractions. A contraction is when the uterus muscle squeezes. This can cause pain and make your belly hard.   You have belly pain.   You have bleeding from your vagina.   You have any symptoms of preeclampsia. These can include:   Bad headache   Changes in vision, such as blurry vision or flashes of lights   Pain in the upper belly  Can I have a normal vaginal delivery? -- Yes. Most people can have a normal vaginal delivery.  Will my baby be healthy? -- Probably. If you have high blood pressure during pregnancy, you are more likely to give birth earlier than normal. That's because if you have preeclampsia, a placental abruption, or a baby that is small for their age, the doctor might need to deliver your baby early. But if your blood pressure is managed during pregnancy, chances are good that your baby will be healthy.  What will happen after I give birth? -- Your doctor or nurse will continue to check your blood " pressure. They will also have you come in for a blood pressure check, or check your blood pressure yourself at home, several days later. This is because blood pressure sometimes goes down right after giving birth but then rises again. If this happens, your doctor might recommend starting blood pressure medicine again (if you stopped during pregnancy) or changing your medicine.  All topics are updated as new evidence becomes available and our peer review process is complete.  This topic retrieved from [employer] on: Mar 27, 2024.  Topic 88281 Version 11.0  Release: 32.2.4 - C32.85  © 2024 UpToDate, Inc. and/or its affiliates. All rights reserved.  Consumer Information Use and Disclaimer   Disclaimer: This generalized information is a limited summary of diagnosis, treatment, and/or medication information. It is not meant to be comprehensive and should be used as a tool to help the user understand and/or assess potential diagnostic and treatment options. It does NOT include all information about conditions, treatments, medications, side effects, or risks that may apply to a specific patient. It is not intended to be medical advice or a substitute for the medical advice, diagnosis, or treatment of a health care provider based on the health care provider's examination and assessment of a patient's specific and unique circumstances. Patients must speak with a health care provider for complete information about their health, medical questions, and treatment options, including any risks or benefits regarding use of medications. This information does not endorse any treatments or medications as safe, effective, or approved for treating a specific patient. UpToDate, Inc. and its affiliates disclaim any warranty or liability relating to this information or the use thereof.The use of this information is governed by the Terms of Use, available at https://www.woltersShutterCaluwer.com/en/know/clinical-effectiveness-terms. 2024© [employer] and its affiliates and/or licensors. All rights reserved.  Copyright   © 2024 [employer] and/or its affiliates. All rights reserved.

## 2024-07-01 NOTE — PROGRESS NOTES
Please refer to the [facility] ultrasound report in Ob Procedures for additional information regarding today's visit

## 2024-07-02 ENCOUNTER — ULTRASOUND (OUTPATIENT)
Dept: PERINATAL CARE | Facility: OTHER | Age: 19
End: 2024-07-02
Payer: COMMERCIAL

## 2024-07-02 ENCOUNTER — ROUTINE PRENATAL (OUTPATIENT)
Dept: OBGYN CLINIC | Facility: CLINIC | Age: 19
End: 2024-07-02

## 2024-07-02 VITALS
HEART RATE: 100 BPM | DIASTOLIC BLOOD PRESSURE: 76 MMHG | BODY MASS INDEX: 33.66 KG/M2 | SYSTOLIC BLOOD PRESSURE: 128 MMHG | HEIGHT: 63 IN | WEIGHT: 190 LBS

## 2024-07-02 VITALS
BODY MASS INDEX: 33.66 KG/M2 | HEART RATE: 100 BPM | HEIGHT: 63 IN | SYSTOLIC BLOOD PRESSURE: 128 MMHG | DIASTOLIC BLOOD PRESSURE: 76 MMHG | WEIGHT: 190 LBS

## 2024-07-02 DIAGNOSIS — Z28.39 RUBELLA NON-IMMUNE STATUS, ANTEPARTUM: ICD-10-CM

## 2024-07-02 DIAGNOSIS — O10.913 MATERNAL CHRONIC HYPERTENSION, THIRD TRIMESTER: ICD-10-CM

## 2024-07-02 DIAGNOSIS — Z3A.37 37 WEEKS GESTATION OF PREGNANCY: Primary | ICD-10-CM

## 2024-07-02 DIAGNOSIS — Z34.93 THIRD TRIMESTER PREGNANCY: Primary | ICD-10-CM

## 2024-07-02 DIAGNOSIS — Z3A.37 37 WEEKS GESTATION OF PREGNANCY: ICD-10-CM

## 2024-07-02 DIAGNOSIS — O10.919 CHRONIC HYPERTENSION AFFECTING PREGNANCY: ICD-10-CM

## 2024-07-02 DIAGNOSIS — Z87.74 HISTORY OF ATRIAL SEPTAL DEFECT: ICD-10-CM

## 2024-07-02 DIAGNOSIS — F12.90 MARIJUANA USE: ICD-10-CM

## 2024-07-02 DIAGNOSIS — O09.899 RUBELLA NON-IMMUNE STATUS, ANTEPARTUM: ICD-10-CM

## 2024-07-02 DIAGNOSIS — Z34.00 SUPERVISION OF NORMAL FIRST PREGNANCY, ANTEPARTUM: ICD-10-CM

## 2024-07-02 PROCEDURE — PNV: Performed by: OBSTETRICS & GYNECOLOGY

## 2024-07-02 PROCEDURE — 59025 FETAL NON-STRESS TEST: CPT | Performed by: OBSTETRICS & GYNECOLOGY

## 2024-07-02 PROCEDURE — 76815 OB US LIMITED FETUS(S): CPT | Performed by: OBSTETRICS & GYNECOLOGY

## 2024-07-02 NOTE — PROGRESS NOTES
Repeat Non-Stress Testing:    Patient verbalizes +FM. Pt denies ALL:               Leaking of fluid   Contractions   Vaginal bleeding   Decreased fetal movement    Patient is performing daily kick counts. Patient has no questions or concerns.   NST strip reviewed by Dr. Vera.

## 2024-07-02 NOTE — LETTER
July 2, 2024     Gillian Pretty MD  [address]  [address]  [place] [address]    Patient: Portia Osorio   YOB: 2005   Date of Visit: 7/2/2024       Dear Dr. Pretty:    Thank you for referring Portia Osorio to me for evaluation. Below are my notes for this consultation.    If you have questions, please do not hesitate to call me. I look forward to following your patient along with you.         Sincerely,        Cj Vera MD        CC: No Recipients    Cj Vera MD  7/2/2024 10:52 AM  Sign when Signing Visit  Please refer to the [facility] ultrasound report in Ob Procedures for additional information regarding today's visit

## 2024-07-03 NOTE — PATIENT INSTRUCTIONS
Kick Counts in Pregnancy   AMBULATORY CARE:   Kick counts  measure how much your baby is moving in your womb. A kick from your baby can be felt as a twist, turn, swish, roll, or jab. It is common to feel your baby kicking at 26 to 28 weeks of pregnancy. You may feel your baby kick as early as 20 weeks of pregnancy. You may want to start counting at 28 weeks.   Contact your doctor immediately if:   You feel a change in the number of kicks or movements of your baby.      You feel fewer than 10 kicks within 2 hours.      You have questions or concerns about your baby's movements.     Why measure kick counts:  Your baby's movement may provide information about your baby's health. He or she may move less, or not at all, if there are problems. Your baby may move less if he or she is not getting enough oxygen or nutrition from the placenta. Do not smoke while you are pregnant. Smoking decreases the amount of oxygen that gets to your baby. Talk to your healthcare provider if you need help to quit smoking. Tell your healthcare provider as soon as you feel a change in your baby's movements.  When to measure kick counts:   Measure kick counts at the same time every day.       Measure kick counts when your baby is awake and most active. Your baby may be most active in the evening.     How to measure kick counts:  Check that your baby is awake before you measure kick counts. You can wake up your baby by lightly pushing on your belly, walking, or drinking something cold. Your healthcare provider may tell you different ways to measure kick counts. You may be told to do the following:  Use a chart or clock to keep track of the time you start and finish counting.      Sit in a chair or lie on your left side.      Place your hands on the largest part of your belly.      Count until you reach 10 kicks. Write down how much time it takes to count 10 kicks.      It may take 30 minutes to 2 hours to count 10 kicks. It should not take more  than 2 hours to count 10 kicks.     Follow up with your doctor as directed:  Write down your questions so you remember to ask them during your visits.   ©  [employer]2023 Information is for End User's use only and may not be sold, redistributed or otherwise used for commercial purposes.  The above information is an  only. It is not intended as medical advice for individual conditions or treatments. Talk to your doctor, nurse or pharmacist before following any medical regimen to see if it is safe and effective for you. Thank you for choosing us for your  care today.  If you have any questions about your ultrasound or care, please do not hesitate to contact us or your primary obstetrician.        Some general instructions for your pregnancy are:    Exercise: Aim for 150 minutes per week of regular exercise.  Walking is great!  Nutrition: Choose healthy sources of calcium, iron, and protein.  Avoid ultraprocessed foods and added sugar.  Learn about Preeclampsia: preeclampsia is a common, potentially serious high blood pressure complication in pregnancy.  A blood pressure of 140mmHg (systolic or top number) or 90mmHg (diastolic or bottom number) should be evaluated by your doctor.  Aspirin is sometimes prescribed in early pregnancy to prevent preeclampsia in women with risk factors - ask your obstetrician if you should be on this medication.  For more resources, visit:  https://www.highriskpregnancyinfo.org/preeclampsia  If you smoke, please try to quit completely but also try to reduce your smoking by as much as possible (as soon as possible).  Do not vape.  Please also avoid cannabis products.  Other warning signs to watch out for in pregnancy or postpartum: chest pain, obstructed breathing or shortness of breath, seizures, thoughts of hurting yourself or your baby, bleeding, a painful or swollen leg, fever, or headache (see [employer] POST-BIRTH Warning Signs campaign).  If these happen  call 911.  Itching is also not normal in pregnancy and if you experience this, especially over your hands and feet, potentially worse at night, notify your doctors.

## 2024-07-08 ENCOUNTER — ULTRASOUND (OUTPATIENT)
Dept: PERINATAL CARE | Facility: OTHER | Age: 19
End: 2024-07-08
Payer: COMMERCIAL

## 2024-07-08 ENCOUNTER — ROUTINE PRENATAL (OUTPATIENT)
Dept: OBGYN CLINIC | Facility: CLINIC | Age: 19
End: 2024-07-08

## 2024-07-08 VITALS
SYSTOLIC BLOOD PRESSURE: 112 MMHG | WEIGHT: 188.2 LBS | DIASTOLIC BLOOD PRESSURE: 72 MMHG | HEIGHT: 63 IN | HEART RATE: 104 BPM | BODY MASS INDEX: 33.35 KG/M2

## 2024-07-08 VITALS
WEIGHT: 188 LBS | DIASTOLIC BLOOD PRESSURE: 72 MMHG | HEART RATE: 104 BPM | HEIGHT: 63 IN | BODY MASS INDEX: 33.31 KG/M2 | SYSTOLIC BLOOD PRESSURE: 112 MMHG

## 2024-07-08 DIAGNOSIS — O10.913 MATERNAL CHRONIC HYPERTENSION, THIRD TRIMESTER: ICD-10-CM

## 2024-07-08 DIAGNOSIS — Z34.00 SUPERVISION OF NORMAL FIRST PREGNANCY, ANTEPARTUM: ICD-10-CM

## 2024-07-08 DIAGNOSIS — Z28.39 RUBELLA NON-IMMUNE STATUS, ANTEPARTUM: ICD-10-CM

## 2024-07-08 DIAGNOSIS — F12.90 MARIJUANA USE: ICD-10-CM

## 2024-07-08 DIAGNOSIS — Z87.74 HISTORY OF ATRIAL SEPTAL DEFECT: ICD-10-CM

## 2024-07-08 DIAGNOSIS — Z3A.37 37 WEEKS GESTATION OF PREGNANCY: Primary | ICD-10-CM

## 2024-07-08 DIAGNOSIS — O09.899 RUBELLA NON-IMMUNE STATUS, ANTEPARTUM: ICD-10-CM

## 2024-07-08 DIAGNOSIS — O10.919 CHRONIC HYPERTENSION AFFECTING PREGNANCY: ICD-10-CM

## 2024-07-08 PROCEDURE — 59025 FETAL NON-STRESS TEST: CPT | Performed by: OBSTETRICS & GYNECOLOGY

## 2024-07-08 PROCEDURE — PNV: Performed by: OBSTETRICS & GYNECOLOGY

## 2024-07-08 PROCEDURE — 76815 OB US LIMITED FETUS(S): CPT | Performed by: OBSTETRICS & GYNECOLOGY

## 2024-07-08 NOTE — PROGRESS NOTES
Please refer to the [facility] ultrasound report in Ob Procedures for additional information regarding today's visit

## 2024-07-08 NOTE — ASSESSMENT & PLAN NOTE
Had NST earlier today  Has IOL scheduled on Weds. Reviewed expectations for process  BP normal today, compliant with medication, no signs/sx superimposed preeclampsia at this time  Precautions reviewed

## 2024-07-08 NOTE — PROGRESS NOTES
"    S: 19 y.o.  37w6d here for routine prenatal visit.        Chief Complaint   Patient presents with    Routine Prenatal Visit         OB complaints:  Contractions: no  Leakage: no  Bleeding: no  Fetal movement: yes      O:  /72   Pulse 104   Ht 5' 3\" (1.6 m)   Wt 85.3 kg (188 lb)   LMP 10/17/2023 (Exact Date)   BMI 33.30 kg/m²       Review of Systems   Constitutional:  Negative for chills and fever.   Eyes:  Negative for visual disturbance.   Respiratory:  Negative for chest tightness and shortness of breath.    Cardiovascular:  Negative for chest pain.   Gastrointestinal:  Negative for abdominal pain, diarrhea, nausea and vomiting.   Genitourinary:  Negative for pelvic pain and vaginal bleeding.        As noted in HPI   Skin:  Negative for rash.   Neurological:  Negative for headaches.   All other systems reviewed and are negative.        Physical Exam  Constitutional:       General: She is not in acute distress.     Appearance: Normal appearance.   HENT:      Head: Normocephalic and atraumatic.   Cardiovascular:      Rate and Rhythm: Normal rate.   Pulmonary:      Effort: Pulmonary effort is normal. No respiratory distress.   Abdominal:      General: There is no distension.      Palpations: Abdomen is soft.      Tenderness: There is no abdominal tenderness. There is no guarding or rebound.      Comments: gravid   Neurological:      General: No focal deficit present.      Mental Status: She is alert.   Psychiatric:         Mood and Affect: Mood normal.         Behavior: Behavior normal.   Vitals and nursing note reviewed.              Fetal Heart Rate: NST    Pregravid Weight/BMI: 57.2 kg (126 lb) (BMI 22.33)  Current Weight: 85.3 kg (188 lb)   Total Weight Gain: 28.1 kg (62 lb)     Pre- Vitals      Flowsheet Row Most Recent Value   Prenatal Assessment    Fetal Heart Rate NST   Movement Present   Prenatal Vitals    Blood Pressure 112/72   Weight - Scale 85.3 kg (188 lb)   Urine " Albumin/Glucose    Dilation/Effacement/Station    Cervical Dilation 0   Cervical Effacement 30   Fetal Station -3   Vaginal Drainage    Edema               Results for orders placed or performed in visit on 06/27/24   Strep B DNA probe, amplification    Specimen: Vaginal/Rectal; Genital   Result Value Ref Range    Strep Grp B PCR Negative Negative         Problem List       37 weeks gestation of pregnancy    Supervision of normal first pregnancy, antepartum    Overview       CF/ SMA testing: declines  Flu vaccine: not completed-  declines  Covid vaccine:  completed X 2  Aneuploidy screening declined. msAFP low risk  Declines tdap   Consents signed            Current Assessment & Plan     Had NST earlier today  Has IOL scheduled on Weds. Reviewed expectations for process  BP normal today, compliant with medication, no signs/sx superimposed preeclampsia at this time  Precautions reviewed          Marijuana use    Overview     Urine drug screen ordered: + THC (1/25/24)         History of atrial septal defect    Overview     Resolved by age2, no surgery required  Fetal echo normal  Maternal echo ordered - NORMAL no ASD          Rubella non-immune status, antepartum    Overview     Rubella titre: borderline, consider postpartum vaccine         Chronic hypertension affecting pregnancy    Overview     No diagnosis or meds prior to pregnancy   2/22/24 @ 18 wks:  /82,  repeat 130/ 82  3/5/24 @ 20 wks:  /82  4/29 148/90 with repeat of 130/72  EFW 34 weeks 80th%  Home BP log at 32-33 weeks: 120's-130's/90's-100's, Procardia XL 30 mg started   Due to headaches after taking procardia switched to labetalol 200 mg BID  IOL scheduled for 38 weeks          Anemia during pregnancy                         Margaux Gibson MD  7/8/2024  7:39 PM

## 2024-07-08 NOTE — LETTER
July 8, 2024     Gillian Pretty MD  [address]  [address]  [place] [address]    Patient: Portia Gonzalez   YOB: 2005   Date of Visit: 7/8/2024       Dear Dr. Pretty:    Thank you for referring Portia Gonzalez to me for evaluation. Below are my notes for this consultation.    If you have questions, please do not hesitate to call me. I look forward to following your patient along with you.         Sincerely,        Cj Vera MD        CC: No Recipients    jC Vera MD  7/8/2024  3:53 PM  Sign when Signing Visit  Please refer to the [facility] ultrasound report in Ob Procedures for additional information regarding today's visit

## 2024-07-10 ENCOUNTER — HOSPITAL ENCOUNTER (INPATIENT)
Facility: HOSPITAL | Age: 19
LOS: 3 days | Discharge: HOME | End: 2024-07-13
Attending: STUDENT IN AN ORGANIZED HEALTH CARE EDUCATION/TRAINING PROGRAM | Admitting: STUDENT IN AN ORGANIZED HEALTH CARE EDUCATION/TRAINING PROGRAM
Payer: COMMERCIAL

## 2024-07-10 ENCOUNTER — HOSPITAL ENCOUNTER (OUTPATIENT)
Dept: LABOR AND DELIVERY | Facility: HOSPITAL | Age: 19
Discharge: HOME | End: 2024-07-10
Payer: COMMERCIAL

## 2024-07-10 DIAGNOSIS — Z3A.37 37 WEEKS GESTATION OF PREGNANCY: Primary | ICD-10-CM

## 2024-07-10 PROBLEM — Z34.90 ENCOUNTER FOR INDUCTION OF LABOR: Status: ACTIVE | Noted: 2024-07-10

## 2024-07-10 LAB
ABO GROUP BLD: NORMAL
ALBUMIN SERPL BCG-MCNC: 3.5 G/DL (ref 3.5–5)
ALP SERPL-CCNC: 177 U/L (ref 34–104)
ALT SERPL W P-5'-P-CCNC: 11 U/L (ref 7–52)
ANION GAP SERPL CALCULATED.3IONS-SCNC: 8 MMOL/L (ref 4–13)
AST SERPL W P-5'-P-CCNC: 17 U/L (ref 13–39)
BILIRUB SERPL-MCNC: 0.26 MG/DL (ref 0.2–1)
BLD GP AB SCN SERPL QL: NEGATIVE
BUN SERPL-MCNC: 7 MG/DL (ref 5–25)
CALCIUM SERPL-MCNC: 9.2 MG/DL (ref 8.4–10.2)
CHLORIDE SERPL-SCNC: 107 MMOL/L (ref 96–108)
CO2 SERPL-SCNC: 21 MMOL/L (ref 21–32)
CREAT SERPL-MCNC: 0.55 MG/DL (ref 0.6–1.3)
CREAT UR-MCNC: 83.6 MG/DL
ERYTHROCYTE [DISTWIDTH] IN BLOOD BY AUTOMATED COUNT: 13.9 % (ref 11.6–15.1)
GFR SERPL CREATININE-BSD FRML MDRD: 135 ML/MIN/1.73SQ M
GLUCOSE SERPL-MCNC: 94 MG/DL (ref 65–140)
HCT VFR BLD AUTO: 30.6 % (ref 34.8–46.1)
HGB BLD-MCNC: 10.1 G/DL (ref 11.5–15.4)
MCH RBC QN AUTO: 28.5 PG (ref 26.8–34.3)
MCHC RBC AUTO-ENTMCNC: 33 G/DL (ref 31.4–37.4)
MCV RBC AUTO: 86 FL (ref 82–98)
PLATELET # BLD AUTO: 336 THOUSANDS/UL (ref 149–390)
PMV BLD AUTO: 9.7 FL (ref 8.9–12.7)
POTASSIUM SERPL-SCNC: 3.9 MMOL/L (ref 3.5–5.3)
PROT SERPL-MCNC: 6.4 G/DL (ref 6.4–8.4)
PROT UR-MCNC: 11 MG/DL
PROT/CREAT UR: 0.13 MG/G{CREAT} (ref 0–0.1)
RBC # BLD AUTO: 3.54 MILLION/UL (ref 3.81–5.12)
RH BLD: POSITIVE
SODIUM SERPL-SCNC: 136 MMOL/L (ref 135–147)
SPECIMEN EXPIRATION DATE: NORMAL
WBC # BLD AUTO: 8.04 THOUSAND/UL (ref 4.31–10.16)

## 2024-07-10 PROCEDURE — 80053 COMPREHEN METABOLIC PANEL: CPT | Performed by: STUDENT IN AN ORGANIZED HEALTH CARE EDUCATION/TRAINING PROGRAM

## 2024-07-10 PROCEDURE — NC001 PR NO CHARGE: Performed by: STUDENT IN AN ORGANIZED HEALTH CARE EDUCATION/TRAINING PROGRAM

## 2024-07-10 PROCEDURE — 86901 BLOOD TYPING SEROLOGIC RH(D): CPT | Performed by: STUDENT IN AN ORGANIZED HEALTH CARE EDUCATION/TRAINING PROGRAM

## 2024-07-10 PROCEDURE — 85027 COMPLETE CBC AUTOMATED: CPT | Performed by: STUDENT IN AN ORGANIZED HEALTH CARE EDUCATION/TRAINING PROGRAM

## 2024-07-10 PROCEDURE — 86850 RBC ANTIBODY SCREEN: CPT | Performed by: STUDENT IN AN ORGANIZED HEALTH CARE EDUCATION/TRAINING PROGRAM

## 2024-07-10 PROCEDURE — 84156 ASSAY OF PROTEIN URINE: CPT

## 2024-07-10 PROCEDURE — 86780 TREPONEMA PALLIDUM: CPT | Performed by: STUDENT IN AN ORGANIZED HEALTH CARE EDUCATION/TRAINING PROGRAM

## 2024-07-10 PROCEDURE — 3E0P7VZ INTRODUCTION OF HORMONE INTO FEMALE REPRODUCTIVE, VIA NATURAL OR ARTIFICIAL OPENING: ICD-10-PCS | Performed by: OBSTETRICS & GYNECOLOGY

## 2024-07-10 PROCEDURE — 86900 BLOOD TYPING SEROLOGIC ABO: CPT | Performed by: STUDENT IN AN ORGANIZED HEALTH CARE EDUCATION/TRAINING PROGRAM

## 2024-07-10 PROCEDURE — 82570 ASSAY OF URINE CREATININE: CPT

## 2024-07-10 PROCEDURE — 4A1HXCZ MONITORING OF PRODUCTS OF CONCEPTION, CARDIAC RATE, EXTERNAL APPROACH: ICD-10-PCS | Performed by: OBSTETRICS & GYNECOLOGY

## 2024-07-10 RX ORDER — ONDANSETRON 2 MG/ML
4 INJECTION INTRAMUSCULAR; INTRAVENOUS EVERY 6 HOURS PRN
Status: DISCONTINUED | OUTPATIENT
Start: 2024-07-10 | End: 2024-07-12

## 2024-07-10 RX ORDER — BUPIVACAINE HYDROCHLORIDE 2.5 MG/ML
30 INJECTION, SOLUTION EPIDURAL; INFILTRATION; INTRACAUDAL ONCE AS NEEDED
Status: DISCONTINUED | OUTPATIENT
Start: 2024-07-10 | End: 2024-07-12

## 2024-07-10 RX ORDER — LABETALOL 200 MG/1
200 TABLET, FILM COATED ORAL 2 TIMES DAILY
Status: DISCONTINUED | OUTPATIENT
Start: 2024-07-10 | End: 2024-07-12

## 2024-07-10 RX ORDER — SODIUM CHLORIDE, SODIUM LACTATE, POTASSIUM CHLORIDE, CALCIUM CHLORIDE 600; 310; 30; 20 MG/100ML; MG/100ML; MG/100ML; MG/100ML
125 INJECTION, SOLUTION INTRAVENOUS CONTINUOUS
Status: DISCONTINUED | OUTPATIENT
Start: 2024-07-10 | End: 2024-07-12

## 2024-07-10 RX ADMIN — Medication 25 MCG: at 22:22

## 2024-07-10 RX ADMIN — SODIUM CHLORIDE, SODIUM LACTATE, POTASSIUM CHLORIDE, AND CALCIUM CHLORIDE 999 ML/HR: .6; .31; .03; .02 INJECTION, SOLUTION INTRAVENOUS at 22:37

## 2024-07-11 ENCOUNTER — ANESTHESIA EVENT (INPATIENT)
Dept: ANESTHESIOLOGY | Facility: HOSPITAL | Age: 19
End: 2024-07-11
Payer: COMMERCIAL

## 2024-07-11 ENCOUNTER — ANESTHESIA (INPATIENT)
Dept: ANESTHESIOLOGY | Facility: HOSPITAL | Age: 19
End: 2024-07-11
Payer: COMMERCIAL

## 2024-07-11 LAB
BASE EXCESS BLDCOA CALC-SCNC: -8.3 MMOL/L (ref 3–11)
BASE EXCESS BLDCOV CALC-SCNC: -6 MMOL/L (ref 1–9)
HCO3 BLDCOA-SCNC: 19.7 MMOL/L (ref 17.3–27.3)
HCO3 BLDCOV-SCNC: 19.7 MMOL/L (ref 12.2–28.6)
O2 CT VFR BLDCOA CALC: 4.5 ML/DL
OXYHGB MFR BLDCOA: 20.3 %
OXYHGB MFR BLDCOV: 71.6 %
PCO2 BLDCOA: 49.6 MM[HG] (ref 30–60)
PCO2 BLDCOV: 39.8 MM HG (ref 27–43)
PH BLDCOA: 7.22 [PH] (ref 7.23–7.43)
PH BLDCOV: 7.31 [PH] (ref 7.19–7.49)
PO2 BLDCOA: 15.9 MM HG (ref 5–25)
PO2 BLDCOV: 35.2 MM HG (ref 15–45)
SAO2 % BLDCOV: 16 ML/DL
TREPONEMA PALLIDUM IGG+IGM AB [PRESENCE] IN SERUM OR PLASMA BY IMMUNOASSAY: NORMAL

## 2024-07-11 PROCEDURE — NC001 PR NO CHARGE: Performed by: CLINICAL NURSE SPECIALIST

## 2024-07-11 PROCEDURE — 10H073Z INSERTION OF MONITORING ELECTRODE INTO PRODUCTS OF CONCEPTION, VIA NATURAL OR ARTIFICIAL OPENING: ICD-10-PCS | Performed by: OBSTETRICS & GYNECOLOGY

## 2024-07-11 PROCEDURE — 82805 BLOOD GASES W/O2 SATURATION: CPT | Performed by: STUDENT IN AN ORGANIZED HEALTH CARE EDUCATION/TRAINING PROGRAM

## 2024-07-11 PROCEDURE — 0KQM0ZZ REPAIR PERINEUM MUSCLE, OPEN APPROACH: ICD-10-PCS | Performed by: OBSTETRICS & GYNECOLOGY

## 2024-07-11 RX ORDER — ROPIVACAINE HYDROCHLORIDE 2 MG/ML
INJECTION, SOLUTION EPIDURAL; INFILTRATION; PERINEURAL CONTINUOUS PRN
Status: DISCONTINUED | OUTPATIENT
Start: 2024-07-11 | End: 2024-07-11 | Stop reason: HOSPADM

## 2024-07-11 RX ORDER — ROPIVACAINE HYDROCHLORIDE 5 MG/ML
INJECTION, SOLUTION EPIDURAL; INFILTRATION; PERINEURAL AS NEEDED
Status: DISCONTINUED | OUTPATIENT
Start: 2024-07-11 | End: 2024-07-11 | Stop reason: HOSPADM

## 2024-07-11 RX ORDER — LIDOCAINE HYDROCHLORIDE AND EPINEPHRINE 15; 5 MG/ML; UG/ML
INJECTION, SOLUTION EPIDURAL
Status: COMPLETED | OUTPATIENT
Start: 2024-07-11 | End: 2024-07-11

## 2024-07-11 RX ORDER — OXYTOCIN/RINGER'S LACTATE 30/500 ML
1-30 PLASTIC BAG, INJECTION (ML) INTRAVENOUS
Status: DISCONTINUED | OUTPATIENT
Start: 2024-07-11 | End: 2024-07-12

## 2024-07-11 RX ADMIN — SODIUM CHLORIDE, SODIUM LACTATE, POTASSIUM CHLORIDE, AND CALCIUM CHLORIDE 125 ML/HR: .6; .31; .03; .02 INJECTION, SOLUTION INTRAVENOUS at 15:20

## 2024-07-11 RX ADMIN — ROPIVACAINE HYDROCHLORIDE: 2 INJECTION, SOLUTION EPIDURAL; INFILTRATION at 11:44

## 2024-07-11 RX ADMIN — LIDOCAINE HYDROCHLORIDE AND EPINEPHRINE 3 ML: 15; 5 INJECTION, SOLUTION EPIDURAL at 11:34

## 2024-07-11 RX ADMIN — MORPHINE SULFATE 2 MG: 2 INJECTION, SOLUTION INTRAMUSCULAR; INTRAVENOUS at 09:01

## 2024-07-11 RX ADMIN — Medication 2 MILLI-UNITS/MIN: at 03:36

## 2024-07-11 RX ADMIN — SODIUM CHLORIDE, SODIUM LACTATE, POTASSIUM CHLORIDE, AND CALCIUM CHLORIDE 999 ML/HR: .6; .31; .03; .02 INJECTION, SOLUTION INTRAVENOUS at 11:04

## 2024-07-11 RX ADMIN — ROPIVACAINE HYDROCHLORIDE 5 ML: 5 INJECTION, SOLUTION EPIDURAL; INFILTRATION; PERINEURAL at 11:36

## 2024-07-11 RX ADMIN — SODIUM CHLORIDE, SODIUM LACTATE, POTASSIUM CHLORIDE, AND CALCIUM CHLORIDE 125 ML/HR: .6; .31; .03; .02 INJECTION, SOLUTION INTRAVENOUS at 20:26

## 2024-07-11 RX ADMIN — SODIUM CHLORIDE, SODIUM LACTATE, POTASSIUM CHLORIDE, AND CALCIUM CHLORIDE 125 ML/HR: .6; .31; .03; .02 INJECTION, SOLUTION INTRAVENOUS at 03:36

## 2024-07-11 RX ADMIN — ROPIVACAINE HYDROCHLORIDE 10 ML/HR: 2 INJECTION EPIDURAL; INFILTRATION; PERINEURAL at 11:39

## 2024-07-11 RX ADMIN — LABETALOL HYDROCHLORIDE 200 MG: 200 TABLET, FILM COATED ORAL at 08:59

## 2024-07-11 RX ADMIN — ROPIVACAINE HYDROCHLORIDE: 2 INJECTION, SOLUTION EPIDURAL; INFILTRATION at 20:26

## 2024-07-11 RX ADMIN — LABETALOL HYDROCHLORIDE 200 MG: 200 TABLET, FILM COATED ORAL at 18:45

## 2024-07-11 RX ADMIN — ONDANSETRON 4 MG: 2 INJECTION INTRAMUSCULAR; INTRAVENOUS at 19:55

## 2024-07-11 NOTE — OB LABOR/OXYTOCIN SAFETY PROGRESS
Labor Progress Note - Portia Gonzalez 19 y.o. female MRN: [id number]    Unit/Bed#: -01 Encounter: [id number]       Contraction Frequency (minutes): 2-4  Contraction Intensity: Mild  Uterine Activity Characteristics: Irregular  Cervical Dilation: 1        Cervical Effacement: 50  Fetal Station: -3  Baseline Rate (FHR): 140 bpm  Fetal Heart Rate (FHT): 130 BPM  FHR Category: I               Vital Signs:   Vitals:    07/10/24 2324   BP: 115/66   Pulse: 75   Resp:    Temp:    SpO2:        Notes/comments:   Portia reports spontaneous leakage of fluid, amniotic fluid noted on cervical check. SVE as above. Plan to start pitocin. FHR category I.    Unique Ceron MD 7/11/2024 3:09 AM

## 2024-07-11 NOTE — OB LABOR/OXYTOCIN SAFETY PROGRESS
Oxytocin Safety Progress Check Note - Portia Gonzalez 19 y.o. female MRN: [id number]    Unit/Bed#: -01 Encounter: [id number]    Dose (bartolo-units/min) Oxytocin: 0 bartolo-units/min  Contraction Frequency (minutes): 1-4  Contraction Intensity: Mild/Moderate  Uterine Activity Characteristics: Regular  Cervical Dilation: 8        Cervical Effacement: 100  Fetal Station: 0  Baseline Rate (FHR): 125 bpm  Fetal Heart Rate (FHT): 130 BPM  FHR Category: II               Vital Signs:   Vitals:    07/11/24 1747   BP: 129/72   Pulse: 91   Resp:    Temp:    SpO2:        Notes/comments:   Prolonged 5 minute deceleration with nessa in the 70s noted on FHR. Patient repositioned to side lying, SVE as above, FSE placed. Pitocin stopped and fluid bolus given. Resolution of deceleration noted, moderate variability present. Patient repositioned to side lying. Plan to leave pitocin off for 30 minutes and then restart if category I tracing.    Unique Ceron MD 7/11/2024 6:05 PM

## 2024-07-11 NOTE — OB LABOR/OXYTOCIN SAFETY PROGRESS
Oxytocin Safety Progress Check Note - Portia Gonzalez 19 y.o. female MRN: [id number]    Unit/Bed#: -01 Encounter: [id number]    Dose (bartolo-units/min) Oxytocin: 20 bartolo-units/min  Contraction Frequency (minutes): 2-3  Contraction Intensity: Mild  Uterine Activity Characteristics: Regular  Cervical Dilation: 2-3        Cervical Effacement: 60  Fetal Station: -2  Baseline Rate (FHR): 130 bpm  Fetal Heart Rate (FHT): 130 BPM  FHR Category: 1               Vital Signs:   Vitals:    07/11/24 1346   BP: 127/80   Pulse: 93   Resp:    Temp:    SpO2:        Notes/comments:   SVE as above. Comfortable with epidural. Continue pitocin titration as tolerated. Dr. Gibson aware.     Kolton Glynn DO 7/11/2024 2:22 PM

## 2024-07-11 NOTE — ASSESSMENT & PLAN NOTE
CHTN- Takes labetalol 200mg BID (procardia gave her headaches)  BP well controlled  No c/o HA, vision changes or RUQ pain  Systolic (24hrs), Av , Min:117 , Max:133   Diastolic (24hrs), Av, Min:82, Max:88

## 2024-07-11 NOTE — OB LABOR/OXYTOCIN SAFETY PROGRESS
Oxytocin Safety Progress Check Note - Portia Gonzalez 19 y.o. female MRN: [id number]    Unit/Bed#: -01 Encounter: [id number]    Dose (bartolo-units/min) Oxytocin: 4 bartolo-units/min  Contraction Frequency (minutes): 1.5-5  Contraction Intensity: Mild  Uterine Activity Characteristics: Irregular  Cervical Dilation: 1        Cervical Effacement: 50  Fetal Station: -3  Baseline Rate (FHR): 140 bpm  Fetal Heart Rate (FHT): 130 BPM  FHR Category: I               Vital Signs:   Vitals:    07/11/24 0454   BP: 124/75   Pulse: 93   Resp:    Temp:    SpO2:        Notes/comments:   SVE deferred at this check, FHR category I. Continue pitocin titration and anticipate SVE in 1-2 hours or earlier as clinically indicated.    Unique Ceron MD 7/11/2024 5:11 AM

## 2024-07-11 NOTE — OB LABOR/OXYTOCIN SAFETY PROGRESS
Oxytocin Safety Progress Check Note - Portia Gonzalez 19 y.o. female MRN: [id number]    Unit/Bed#: -01 Encounter: [id number]    Dose (bartolo-units/min) Oxytocin: 10 bartolo-units/min  Contraction Frequency (minutes): 2-3  Contraction Intensity: Mild  Uterine Activity Characteristics: Regular  Cervical Dilation: 1        Cervical Effacement: 50  Fetal Station: -3  Baseline Rate (FHR): 135 bpm  Fetal Heart Rate (FHT): 130 BPM  FHR Category: I               Vital Signs:   Vitals:    07/11/24 0631   BP:    Pulse:    Resp:    Temp: 98.4 °F (36.9 °C)   SpO2:        Notes/comments:   Portia is beginning to report increasing pain with contractions. SVE as above, unchanged from prior. FHR category I. Continue pitocin titration.    Unique Ceron MD 7/11/2024 6:38 AM

## 2024-07-11 NOTE — H&P
H & P- Obstetrics   Portia Gonzalez 19 y.o. female MRN: [id number]  Unit/Bed#: -01 Encounter: [id number]    Assessment: 19 y.o.  at 38w1d admitted for IOL in the setting of chronic HTN. Pre-E labs pending.  SVE: 0/0/-4  FHT: category 1 (baseline 135, moderate variability, 15x15 accelerations, no decelerations)  Clinical EFW: 80% (AC 85%) ; Cephalic confirmed by TAUS  GBS status: negative   Postpartum contraception plan: declines     Plan:   Encounter for induction of labor  Assessment & Plan  [facility]   [facility] diet   F/u T&S, CBC, RPR   IVF LR 125cc/hr   Continuous fetal monitoring and tocometry   Analgesia at maternal request   Vertex by TAUS  GBS negative   Induction plan vaginal cytotec, josuha balloon, pitocin (SVE 0/0/-4 on admission)   Postpartum contraception plan: declines       Anemia during pregnancy  Assessment & Plan  Admission Hgb 10.1  Takes ferrous sulfate 325mg once per day    Chronic hypertension affecting pregnancy  Assessment & Plan  /82 on admission   Takes labetalol 200mg BID (procardia gave her headaches)  Admission Pre-E labs pending     Rubella non-immune status, antepartum  Assessment & Plan  Can offer vaccination postpartum           Discussed case and plan w/ Dr. Lo      Chief Complaint: I am here for my induction    HPI: Portia Gonzalez is a 19 y.o.  with an THAIS of 2024, by Last Menstrual Period at 38w1d who is being admitted for induction of labor in the setting of chronic HTN. She denies having uterine contractions, has no LOF, and reports no VB. She states she has felt good FM. She denies HA, vision changes, RUQ pain, SOB or leg swelling. Pre-E labs pending.    Patient Active Problem List   Diagnosis    37 weeks gestation of pregnancy    Supervision of normal first pregnancy, antepartum    Marijuana use    History of atrial septal defect    Rubella non-immune status, antepartum    Chronic hypertension affecting pregnancy    Anemia during  pregnancy    Encounter for induction of labor     Pregnancy complication: cHTN on labetalol 200mg BID, anemia on iron    Baby complications/comments: none    Review of Systems   Constitutional:  Negative for fatigue and fever.   Eyes:  Negative for photophobia and visual disturbance.   Respiratory:  Negative for chest tightness and shortness of breath.    Cardiovascular:  Negative for chest pain.   Gastrointestinal:  Negative for abdominal pain, diarrhea, nausea and vomiting.   Genitourinary:  Negative for dysuria, urgency, vaginal bleeding and vaginal discharge.   Neurological:  Negative for dizziness, weakness and headaches.       OB Hx:  OB History    Para Term  AB Living   1 0 0 0 0     SAB IAB Ectopic Multiple Live Births   0 0 0          # Outcome Date GA Lbr Seth/2nd Weight Sex Type Anes PTL Lv   1 Current                Past Medical Hx:  Past Medical History:   Diagnosis Date    ASD (atrial septal defect)     Pt states cleared up on its own       Past Surgical hx:  Past Surgical History:   Procedure Laterality Date    WISDOM TOOTH EXTRACTION         Social Hx:  Alcohol use: denies   Tobacco use: in a vape, last use in 2024  Other substance use: marijuana, last use in 2024        No Known Allergies      Medications Prior to Admission:     ferrous sulfate 325 (65 FE) MG EC tablet    labetalol (NORMODYNE) 200 mg tablet    Prenatal Vit-Fe Fumarate-FA (Prenatal Vitamin) 27-0.8 MG TABS    Objective:  Temp:  [97.9 °F (36.6 °C)] 97.9 °F (36.6 °C)  HR:  [89] 89  Resp:  [18] 18  BP: (132)/(82) 132/82  Body mass index is 33.3 kg/m².     Physical Exam:  Physical Exam  Constitutional:       Appearance: Normal appearance.   HENT:      Head: Normocephalic and atraumatic.   Cardiovascular:      Rate and Rhythm: Normal rate.   Pulmonary:      Effort: Pulmonary effort is normal.   Abdominal:      Tenderness: There is no abdominal tenderness.      Comments: Gravid   Musculoskeletal:          General: Normal range of motion.   Neurological:      General: No focal deficit present.      Mental Status: She is alert and oriented to person, place, and time.   Psychiatric:         Mood and Affect: Mood normal.         Behavior: Behavior normal.          FHT:  Baseline Rate (FHR): 135 bpm  Variability: Moderate  Accelerations: 15 x 15 or greater, At variable times    TOCO:   Contraction Frequency (minutes): irregular  Contraction Duration (seconds): 60-90  Contraction Intensity: Mild    Lab Results   Component Value Date    WBC 8.04 07/10/2024    HGB 10.1 (L) 07/10/2024    HCT 30.6 (L) 07/10/2024     07/10/2024     Lab Results   Component Value Date    K 3.9 07/10/2024     07/10/2024    CO2 21 07/10/2024    BUN 7 07/10/2024    CREATININE 0.55 (L) 07/10/2024    AST 17 07/10/2024    ALT 11 07/10/2024     Prenatal Labs: Reviewed      Blood type: A+  Antibody: negative  GBS: negative   HIV: non-reactive  Rubella: non-immune/borderline   Syphilis IgM/IgG: non-reactive  HBsAg: non-reactive  HCAb: non-reactive   Chlamydia: negative  Gonorrhea: negative  Diabetes 1 hour screen: 92  Platelets: 336  Hgb: 10.1    >2 Midnights  INPATIENT     Signature/Title: Lupe Mancilla MS4     Unique VÁZQUEZ PGY2  Date: 7/10/2024  Time: 9:48 PM

## 2024-07-11 NOTE — ANESTHESIA PREPROCEDURE EVALUATION
Procedure:  LABOR ANALGESIA    Relevant Problems   CARDIO   (+) Chronic hypertension affecting pregnancy      GYN   (+) 37 weeks gestation of pregnancy   (+) Supervision of normal first pregnancy, antepartum      HEMATOLOGY   (+) Anemia during pregnancy      Behavioral Health   (+) Marijuana use (Last use Jan)      Obstetrics/Gynecology   (+) Rubella non-immune status, antepartum      Other   (+) Encounter for induction of labor   (+) History of atrial septal defect (1. Patient was tachycardic during the study.  2. Normal transthoracic echocardiogram.  No evidence of any residual PFO/ASD or VSD.  )      5/2024 Echo:  Patient was tachycardic during the study.  Normal transthoracic echocardiogram.  No evidence of any residual PFO/ASD or VSD.  Physical Exam    Airway    Mallampati score: II         Dental   No notable dental hx     Cardiovascular  Cardiovascular exam normal    Pulmonary  Pulmonary exam normal     Other Findings  Nose and ear piercings  post-pubertal.      Anesthesia Plan  ASA Score- 2     Anesthesia Type- epidural with ASA Monitors.         Additional Monitors:     Airway Plan:            Plan Factors-Exercise tolerance (METS): >4 METS.    Chart reviewed.   Existing labs reviewed.     Patient is not a current smoker.              Induction-     Postoperative Plan-     Perioperative Resuscitation Plan - Level 1 - Full Code.       Informed Consent- Anesthetic plan and risks discussed with patient.

## 2024-07-11 NOTE — OB LABOR/OXYTOCIN SAFETY PROGRESS
Labor Progress Note - Portia Gonzalez 19 y.o. female MRN: [id number]    Unit/Bed#: -01 Encounter: [id number]       Contraction Frequency (minutes): irregular  Contraction Intensity: Mild  Uterine Activity Characteristics: Irregular  Cervical Dilation: Closed        Cervical Effacement: 0  Fetal Station: Ballotable  Baseline Rate (FHR): 135 bpm  Fetal Heart Rate (FHT): 145 BPM  FHR Category: I               Vital Signs:   Vitals:    07/10/24 2040   BP: 132/82   Pulse: 89   Resp: 18   Temp: 97.9 °F (36.6 °C)       Notes/comments:   Pt is feeling wel. 25 mcg vaginal cytotec placed.    Unique Ceron MD 7/10/2024 10:26 PM

## 2024-07-11 NOTE — OB LABOR/OXYTOCIN SAFETY PROGRESS
Oxytocin Safety Progress Check Note - Portia Gonzalez 19 y.o. female MRN: [id number]    Unit/Bed#: -01 Encounter: [id number]    Dose (bartolo-units/min) Oxytocin: 14 bartolo-units/min (Per Dr. Torres)  Contraction Frequency (minutes): 1-3  Contraction Intensity: Mild  Uterine Activity Characteristics: Regular  Cervical Dilation: 1        Cervical Effacement: 50  Fetal Station: -3  Baseline Rate (FHR): 130 bpm  Fetal Heart Rate (FHT): 130 BPM  FHR Category: 1               Vital Signs:   Vitals:    07/11/24 0854   BP: 131/86   Pulse: 86   Resp:    Temp:    SpO2:        Notes/comments:   Patient tolerating induction well, pain well controlled, no complaints, was contraction q1-2 minutes on Oxytocin 16, turned down to 14 to determine if decreasing Pitocin dose will space out contractions.      Ashli Torres MD 7/11/2024 9:40 AM

## 2024-07-11 NOTE — OB LABOR/OXYTOCIN SAFETY PROGRESS
Labor Progress Note - Portia Gonzalez 19 y.o. female MRN: [id number]    Unit/Bed#: -01 Encounter: [id number]    Dose (bartolo-units/min) Oxytocin: 14 bartolo-units/min (Per Dr. Torres)  Contraction Frequency (minutes): 1-3  Contraction Intensity: Mild  Uterine Activity Characteristics: Regular  Cervical Dilation: 1        Cervical Effacement: 50  Fetal Station: -3  Baseline Rate (FHR): 130 bpm  Fetal Heart Rate (FHT): 130 BPM  FHR Category: 1               Vital Signs:   Vitals:    07/11/24 1048   BP:    Pulse:    Resp:    Temp: 98.4 °F (36.9 °C)   SpO2:        Portia is feeling strong contractions. SVE as above. FHT cat 1. Plans for epidural. Continue to titrate pitocin as tolerated.     Charisma Sheikh MD 7/11/2024 11:02 AM

## 2024-07-11 NOTE — ANESTHESIA PROCEDURE NOTES
Epidural Block    Patient location during procedure: OB/L&D  Start time: 7/11/2024 11:32 AM  Reason for block: primary anesthetic  Staffing  Performed by: Hilary Flores MD  Authorized by: Hilary Flores MD    Preanesthetic Checklist  Completed: patient identified, IV checked, site marked, risks and benefits discussed, surgical consent, monitors and equipment checked, pre-op evaluation and timeout performed  Epidural  Patient position: sitting  Prep: Betadine  Sedation Level: no sedation  Patient monitoring: continuous pulse oximetry, frequent blood pressure checks and heart rate  Approach: right paramedian  Location: lumbar, L3-4  Injection technique: VIVEK saline  Needle  Needle type: Tuohy   Needle gauge: 18 G  Needle insertion depth: 7 cm  Catheter type: multi-orifice  Catheter size: 20 G  Catheter at skin depth: 12 cm  Catheter securement method: clear occlusive dressing  Test dose: negativelidocaine-epinephrine (XYLOCAINE-MPF/EPINEPHRINE) 1.5 %-1:200,000 injection 3 mL - Epidural   3 mL - 7/11/2024 11:34:00 AM  Assessment  Sensory level: T10  Number of attempts: 3 or more (midline x2, no VIVEK with repositioning assistance, paramedian successful)negative aspiration for CSF, negative aspiration for heme and no paresthesia on injection  patient tolerated the procedure well with no immediate complications

## 2024-07-11 NOTE — PLAN OF CARE
Problem: Knowledge Deficit  Goal: Verbalizes understanding of labor plan  Description: Assess patient/family/caregiver's baseline knowledge level and ability to understand information.  Provide education via patient/family/caregiver's preferred learning method at appropriate level of understanding.     1. Provide teaching at level of understanding.  2. Provide teaching via preferred learning method(s).  7/11/2024 0707 by Inés Erickson RN  Outcome: Progressing  7/11/2024 0707 by Inés Erickson RN  Outcome: Progressing     Problem: Labor & Delivery  Goal: Manages discomfort  Description: Assess and monitor for signs and symptoms of discomfort.  Assess patient's pain level regularly and per hospital policy.  Administer medications as ordered. Support use of nonpharmacological methods to help control pain such as distraction, imagery, relaxation, and application of heat and cold.  Collaborate with interdisciplinary team and patient to determine appropriate pain management plan.    1. Include patient in decisions related to comfort.  2. Offer non-pharmacological pain management interventions.  3. Report ineffective pain management to physician.  7/11/2024 0707 by Inés Erickson RN  Outcome: Progressing  7/11/2024 0707 by Inés Erickson RN  Outcome: Progressing  Goal: Patient vital signs are stable  Description: 1. Assess vital signs - vaginal delivery.  7/11/2024 0707 by Inés Erickson RN  Outcome: Progressing  7/11/2024 0707 by Inés Erickson RN  Outcome: Progressing

## 2024-07-12 PROCEDURE — 59400 OBSTETRICAL CARE: CPT | Performed by: OBSTETRICS & GYNECOLOGY

## 2024-07-12 PROCEDURE — 88307 TISSUE EXAM BY PATHOLOGIST: CPT | Performed by: PATHOLOGY

## 2024-07-12 PROCEDURE — 99024 POSTOP FOLLOW-UP VISIT: CPT | Performed by: OBSTETRICS & GYNECOLOGY

## 2024-07-12 RX ORDER — CALCIUM CARBONATE 500 MG/1
1000 TABLET, CHEWABLE ORAL DAILY PRN
Status: DISCONTINUED | OUTPATIENT
Start: 2024-07-12 | End: 2024-07-13 | Stop reason: HOSPADM

## 2024-07-12 RX ORDER — LABETALOL 200 MG/1
200 TABLET, FILM COATED ORAL 2 TIMES DAILY
Status: DISCONTINUED | OUTPATIENT
Start: 2024-07-12 | End: 2024-07-13 | Stop reason: HOSPADM

## 2024-07-12 RX ORDER — ONDANSETRON 2 MG/ML
4 INJECTION INTRAMUSCULAR; INTRAVENOUS EVERY 8 HOURS PRN
Status: DISCONTINUED | OUTPATIENT
Start: 2024-07-12 | End: 2024-07-13 | Stop reason: HOSPADM

## 2024-07-12 RX ORDER — IBUPROFEN 600 MG/1
600 TABLET ORAL EVERY 6 HOURS
Status: DISCONTINUED | OUTPATIENT
Start: 2024-07-12 | End: 2024-07-13 | Stop reason: HOSPADM

## 2024-07-12 RX ORDER — SIMETHICONE 80 MG
80 TABLET,CHEWABLE ORAL 4 TIMES DAILY PRN
Status: DISCONTINUED | OUTPATIENT
Start: 2024-07-12 | End: 2024-07-13 | Stop reason: HOSPADM

## 2024-07-12 RX ORDER — SENNOSIDES 8.6 MG
1 TABLET ORAL DAILY
Status: DISCONTINUED | OUTPATIENT
Start: 2024-07-12 | End: 2024-07-13 | Stop reason: HOSPADM

## 2024-07-12 RX ORDER — BENZOCAINE/MENTHOL 6 MG-10 MG
1 LOZENGE MUCOUS MEMBRANE DAILY PRN
Status: DISCONTINUED | OUTPATIENT
Start: 2024-07-12 | End: 2024-07-13 | Stop reason: HOSPADM

## 2024-07-12 RX ORDER — OXYTOCIN/RINGER'S LACTATE 30/500 ML
250 PLASTIC BAG, INJECTION (ML) INTRAVENOUS ONCE
Status: DISCONTINUED | OUTPATIENT
Start: 2024-07-12 | End: 2024-07-13 | Stop reason: HOSPADM

## 2024-07-12 RX ORDER — ACETAMINOPHEN 325 MG/1
650 TABLET ORAL EVERY 4 HOURS PRN
Status: DISCONTINUED | OUTPATIENT
Start: 2024-07-12 | End: 2024-07-13 | Stop reason: HOSPADM

## 2024-07-12 RX ADMIN — WITCH HAZEL 1 PAD: 500 SOLUTION RECTAL; TOPICAL at 00:24

## 2024-07-12 RX ADMIN — IBUPROFEN 600 MG: 600 TABLET, FILM COATED ORAL at 18:04

## 2024-07-12 RX ADMIN — BENZOCAINE AND LEVOMENTHOL 1 APPLICATION: 200; 5 SPRAY TOPICAL at 00:24

## 2024-07-12 RX ADMIN — SENNOSIDES 8.6 MG: 8.6 TABLET, FILM COATED ORAL at 08:04

## 2024-07-12 RX ADMIN — LABETALOL HYDROCHLORIDE 200 MG: 200 TABLET, FILM COATED ORAL at 08:04

## 2024-07-12 RX ADMIN — LABETALOL HYDROCHLORIDE 200 MG: 200 TABLET, FILM COATED ORAL at 18:04

## 2024-07-12 RX ADMIN — IBUPROFEN 600 MG: 600 TABLET, FILM COATED ORAL at 00:24

## 2024-07-12 RX ADMIN — IBUPROFEN 600 MG: 600 TABLET, FILM COATED ORAL at 08:04

## 2024-07-12 NOTE — ANESTHESIA POSTPROCEDURE EVALUATION
"Post-Op Assessment Note    CV Status:  Stable    Pain management: adequate      Post-op block assessment: catheter intact and no complications   Mental Status:  Alert and awake   Hydration Status:  Euvolemic   PONV Controlled:  Controlled   Airway Patency:  Patent     Post Op Vitals Reviewed: Yes    No anethesia notable event occurred.    Staff: Anesthesiologist               BP (!) 81/52 (07/11/24 2250)    Temp      Pulse (!) 111 (07/11/24 2250)   Resp      SpO2      BP (!) 81/52   Pulse (!) 111   Temp 98.5 °F (36.9 °C) (Oral)   Resp 18   Ht 5' 3\" (1.6 m)   Wt 85.3 kg (188 lb)   LMP 10/17/2023 (Exact Date)   SpO2 98%   BMI 33.30 kg/m²     "

## 2024-07-12 NOTE — DISCHARGE INSTRUCTIONS
Self Care After Delivery   AMBULATORY CARE:   The postpartum period is the period of time from delivery to about 6 weeks. During this time you may experience many physical and emotional changes. It is important to understand what is normal and when you need to call your healthcare provider. It is also important to know how to care for yourself during this time.  Call your local emergency number (911 in the [place]) for any of the following:   You see or hear things that are not there, or have thoughts of harming yourself or your baby.     You soak through 1 pad in 15 minutes, have blurry vision, clammy or pale skin, and feel faint.     You faint or lose consciousness.     You have trouble breathing.     You cough up blood.     Your  incision comes apart.     Seek care immediately if:   Your heart is beating faster than usual.     You have a bad headache or changes in your vision.     Your perineal tear, episiotomy site, or  incision is red, swollen, bleeding, or draining pus.     You have severe abdominal pain.     Call your doctor or obstetrician if:   Your leg is painful, red, and larger than usual.     You soak through 1 or more pads in an hour, or pass blood clots larger than a quarter from your vagina.     You have a fever.     You have new or worsening pain in your abdomen or vagina.     You continue to have depression 1 to 2 weeks after you deliver.     You have trouble sleeping.     You have foul-smelling discharge from your vagina.     You have pain or burning when you urinate.     You do not have a bowel movement for 3 days or more.     You have nausea or are vomiting.     You have hard lumps or red streaks over your breasts.     You have cracked nipples or bleed from your nipples.     You have questions or concerns about your condition or care.     Physical changes: The following are normal changes after you give birth:  Pain in the area between your anus and vagina     Breast pain      Constipation or hemorrhoids     Hot or cold flashes     Vaginal bleeding or discharge     Mild to moderate abdominal cramping     Difficulty controlling bowel movements or urine     Emotional changes: A drop in hormone levels after you deliver may cause changes in your emotions. You may feel irritable, sad, or anxious. You may cry easily or for no reason. You may also feel depressed. Depression that continues can be a sign of postpartum depression, a condition that can be treated. Treatment may include talk therapy, medicines, or both. Healthcare providers will ask how you are feeling and if you have any depression. These talks can happen during appointments for your medical care and for your baby's care, such as well child visits. Providers can help you find ways to care for yourself and your baby. Talk to your providers about the following:  When emotional changes or depression started, and if it is getting worse over time     Problems you are having with daily activities, sleep, or caring for your baby     If anything makes you feel worse, or makes you feel better     Feeling that you are not bonding with your baby the way you want     Any problems your baby has with sleeping or feeding     Your baby is fussy or cries a lot     Support you have from friends, family, or others     Breast care for breastfeeding mothers: You may have sore breasts for 3 to 6 days after you give birth. This happens as your milk begins to fill your breasts. You may also have sore breasts if you do not breastfeed frequently. Do the following to care for your breasts:  Apply a moist, warm, compress to your breast as directed. This may help soothe your breasts. Make sure the washcloth is not too hot before you apply it to your breast.     Nurse your baby or pump your milk frequently. This may prevent clogged milk ducts. Ask your healthcare provider how often to nurse or pump.     Massage your breasts as directed. This may help increase  your milk flow. Gently rub your breasts in a circular motion before you breastfeed. You may need to gently squeeze your breast or nipple to help release milk. You can also use a breast pump to help release milk from your breast.     Wash your breasts with warm water only. Do not put soap on your nipples. Soap may cause your nipples to become dry.     Apply lanolin cream to your nipples as directed. Lanolin cream may add moisture to your skin and prevent nipple dryness. Always wash off lanolin cream with warm water before you breastfeed.     Place pads in your bra. Your nipples may leak milk when you are not breastfeeding. You can place pads inside of your bra to help prevent leaking onto your clothing. Ask your healthcare provider where to purchase bra pads.     Get breastfeeding support if needed. Healthcare providers can answer questions about breastfeeding and provide you with support. Ask your healthcare provider who you can contact if you need breastfeeding support.     Breast care for non-breastfeeding mothers: Milk will fill your breasts even if you bottle feed your baby. Do the following to help stop your milk from filling your breasts and causing pain:  Wear a bra with support at all times. A sports bra or a tight-fitting bra will help stop your milk from coming in.     Apply ice on each breast for 15 to 20 minutes every hour or as directed. Use an ice pack, or put crushed ice in a plastic bag. Cover it with a towel before you apply it to your breast. Ice helps your milk ducts shrink.     Keep your breasts away from warm water. Warm water will make it easier for milk to fill your breasts. Stand with your breasts away from warm water in the shower.     Limit how much you touch your breasts. This will prevent them from filling with milk.     Perineum care: Your perineum is the area between your rectum and vagina. It is normal to have swelling and pain in this area after you give birth. If you had an  episiotomy, your healthcare provider may give you special instructions.  Clean your perineum after you use the bathroom. This may prevent infection and help with healing. Use a spray bottle with warm water to clean your perineum. You may also gently spray warm water against your perineum when you urinate. Always wipe front to back.     Take a sitz bath as directed. A sitz bath may help relieve swelling and pain. Fill your bath tub or bucket with water up to your hips and sit in the water. Use cold water for 2 days after you deliver. Then use warm water. Ask your healthcare provider for more information about a sitz bath.     Apply ice packs for the first 24 hours or as directed. Use a plastic glove filled with ice or buy an ice pack. Wrap the ice pack or plastic glove in a small towel or wash cloth. Place the ice pack on your perineum for 20 minutes at a time.     Sit on a donut-shaped pillow. This may relieve pressure on your perineum when you sit.     Use wipes that contain medicine or take pills as directed. Your healthcare provider may tell you to use witch hazel pads. You can place witch hazel pads in the refrigerator before you apply them to your perineum. Your provider may also tell you to take NSAIDs. Ask him or her how often to take pills or use the wipes.     Do not go swimming or take tub baths for 4 to 6 weeks or as directed. This will help prevent an infection in your vagina or uterus.     Bowel and bladder care: It may take 3 to 5 days to have a bowel movement after you deliver your baby. You can do the following to prevent or manage constipation, and get control of your bowel or bladder:  Take stool softeners as directed. A stool softener is medicine that will make your bowel movements softer. This may prevent or relieve constipation. A stool softener may also make bowel movements less painful.     Drink plenty of liquids. Ask how much liquid to drink each day and which liquids are best for you.  Liquids may help prevent constipation.     Eat foods high in fiber. Examples include fruits, vegetables, grains, beans, and lentils. Ask your healthcare provider how much fiber you need each day. Fiber may prevent constipation.          Do Kegel exercises as directed. Kegel exercises will help strengthen the muscles that control bowel movements and urination. Ask your healthcare provider for more information on Kegel exercises.     Apply cold compresses or medicine to hemorrhoids as directed. This may relieve swelling and pain. Your healthcare provider may tell you to apply ice or wipes that contain medicine to your hemorrhoids. He or she may also tell you to use a sitz bath. Ask your provider for more information on how to manage hemorrhoids.     Nutrition: Good nutrition is important in the postpartum period. It will help you return to a healthy weight, increase your energy levels, and prevent constipation. It will also help you get enough nutrients and calories if you are going to breastfeed your baby.  Eat a variety of healthy foods. Healthy foods include fruits, vegetables, whole-grain breads, low-fat dairy products, beans, lean meats, and fish. You may need 500 to 700 extra calories each day if you breastfeed your baby. You may also need extra protein.          Limit foods with added sugar and high amounts of fat. These foods are high in calories and low in healthy nutrients. Read food labels so you know how much sugar and fat is in the food you want to eat.     Drink 8 to 10 glasses of water per day. Water will help you make plenty of milk for your baby. It will also help prevent constipation. Drink a glass of water every time you breastfeed your baby.     Take vitamins as directed. Ask your healthcare provider what vitamins you need.     Limit caffeine and alcohol if you are breastfeeding. Caffeine and alcohol can get into your breast milk. Caffeine and alcohol can make your baby fussy. They can also  interfere with your baby's sleep. Ask your healthcare provider if you can drink alcohol or caffeine.     Rest and sleep: You may feel very tired in the postpartum period. Enough sleep will help you heal and give you energy to care for your baby. The following may help you get sleep and rest:  Nap when your baby naps. Your baby may nap several times during the day. Get rest during this time.     Limit visitors. Many people may want to see you and your baby. Ask friends or family to visit on different days. This will give you time to rest.     Do not plan too much for one day. Put off household chores so that you have time to rest. Gradually do more each day.     Ask for help from family, friends, or neighbors. Ask them to help you with laundry, cleaning, or errands. Also ask someone to watch the baby while you take a nap or relax. Ask your partner to help with the care of your baby. Pump some of your breast milk so your partner can feed your baby during the night.     Exercise after delivery: Wait until your healthcare provider says it is okay to exercise. Exercise can help you lose weight, increase your energy levels, and manage your mood. It can also prevent constipation and blood clots. Start with gentle exercises such as walking. Do more as you have more energy. You may need to avoid abdominal exercises for 1 to 2 weeks after you deliver. Talk to your healthcare provider about an exercise plan that is right for you.     Sexual activity after delivery:   Do not have sex until your healthcare provider says it is okay. You may need to wait 4 to 6 weeks before you have sex. This may prevent infection and allow time to heal.     Your menstrual cycle may begin as soon as 3 weeks after you deliver. Your period may be delayed if you breastfeed your baby. You can become pregnant before you get your first postpartum period. Talk to your healthcare provider about birth control that is right for you. Some types of birth  control are not safe during breastfeeding.     For support and more information: Join a support group for new mothers. Ask for help from family and friends with chores, errands, and care of your baby.  Office of Women's Health,  Department of Health and Human Services  [address] [place] [address]  [address] [place] [address]  Phone: [phone]  Web Address: www.womenshealth.gov  [facility]partum [facility]  [address] [place] [address]  Web Address: [email or web address]  Follow up with your doctor or obstetrician as directed: You will need to follow up within 2 to 6 weeks of delivery. Write down your questions so you remember to ask them at your visits.  © Copyright [employer] 2020 Information is for End User's use only and may not be sold, redistributed or otherwise used for commercial purposes. All illustrations and images included in CareNotes® are the copyrighted property of [employer] or [employer]  The above information is an  only. It is not intended as medical advice for individual conditions or treatments. Talk to your doctor, nurse or pharmacist before following any medical regimen to see if it is safe and effective for you.

## 2024-07-12 NOTE — PLAN OF CARE

## 2024-07-12 NOTE — L&D DELIVERY NOTE
Vaginal Delivery Summary - OB/GYN   Portia Gonzalez 19 y.o. female MRN: [id number]  Unit/Bed#: -01 Encounter: [id number]    Pre-delivery Diagnosis:   Pregnancy at 38w2d   Chronic hypertension on labetalol   Rubella non immune  Anemia    Post-delivery Diagnosis: same, delivered    Procedure: Spontaneous Vaginal Delivery     OBGYN Practice: [facility] ([facility])    Attending Physician: Dr. Gibson  Resident Physician: Dr. Unique Ceron  Other Assistant: Lupe Mancilla    Anesthesia: Epidural,     QBL: Non-Surgical QBL (mL): 50        Complications: none apparent    Specimens:   1. Arterial and venous cord gases  2. Cord blood  3. Segment of umbilical cord  4. Placenta to pathology     Findings:  1. Viable male  on 2024 10:23 PM via Vaginal, Spontaneous  with APGAR scores of 8  and 9  at 1 and 5 minutes, respectively. Weight pending at time of dictation for skin to skin bonding.  2. Spontaneous delivery of intact placenta at 2230  3. 2 degree laceration repaired with 3-0 Vicryl Rapide      Gases:  Umbilical Artery  Recent Labs     24  2225   PHCART 7.217*   BECART -8.3*       Umbilical Vein  Recent Labs     24  2225   PHCVEN 7.313   BECVEN -6.0*         Brief history and labor course:  Portia Gonzalez is a 19 y.o.  who was admitted at 38w1d for IOL in the setting of chronic HTN. Pressures remained WNL for most of her labor course, but reached max of 146/86. Pre-E labs WNL. On admission she was 0/0/-2 and given cytotec. SROM for clear fluid. She received pitocin for augmentation and an epidural for pain control. FHT remained category 1 for most of her labor, but was category 2 at one point due to prolonged 5 minute deceleration with nessa in the 70s that resolved with maternal repositioning and pit break. She progressed to complete and began pushing.     Description of delivery:    After pushing for 53 minutes, Portia delivered a viable male ,  weight pending as mother is doing skin to skin bonding. The fetal vertex delivered ROSANGELA position spontaneously. There was one loop of nuchal cord which was reduced. The anterior shoulder delivered atraumatically with maternal expulsive forces and the assistance of gentle downward traction. The posterior shoulder delivered with maternal expulsive forces and the assistance of gentle upward traction. The remainder of the fetus delivered spontaneously.      Upon delivery, the infant was placed on Portia's abdomen and the cord was doubly clamped and cut. Delayed cord clamping was achieved. The infant was noted to cry spontaneously and was moving all extremities appropriately. There was no evidence for injury. Awaiting nurse resuscitators evaluated the . Arterial and venous cord blood gases and cord blood was collected for analysis. These were promptly sent to the lab. In the immediate post-partum, active management of the 3rd stage of labor was performed with massage, the administration of 30 units of IV pitocin, and gentle traction on the umbilical cord. The placenta delivered spontaneously and was noted to have a centrally inserted 3 vessel cord.  The placenta was sent to pathology     Laceration Repair  The vagina, cervix, perineum, and rectum were inspected and there was noted to be a second degree perineal laceration.    The patient was comfortable with epidural for analgesia. The vaginal mucosa and submucosa were then re approximated using 3-0 vicryl rapide to the point of the hymenal ring. The superficial perineal fascia was then re approximated using 3-0 vicryl in a running non locked stitch downward followed by a running subcuticular stitch upward to the level of the introitus.    She additionally had a periurethral laceration that was repaired with a figure of eight stitch.    At the conclusion of the procedure, all needle, sponge, and instrument counts were noted to be correct. Dr. Gibson was present  and participated in all key portions of the case.    Disposition:  The patient and the  both tolerated the procedure well and are recovering in labor and delivery room       Unique Ceron MD  PGY 2, Obstetrics and Gynecology  2024  1:30 AM

## 2024-07-12 NOTE — PROGRESS NOTES
"Progress Note - OB/GYN  Portia Gonzalez 19 y.o. female MRN: [id number]  Unit/Bed#:  415-01 Encounter: [id number]    Assessment and Plan     Portia Gonzalez is a patient of: [facility] ([facility] She is PPD# 1 s/p  vaginal birth after  ()  Recovering well and is stable       *  (spontaneous vaginal delivery)  Assessment & Plan  Admit to OBN   Clear liquid diet   F/u T&S, CBC, RPR   IVF LR 125cc/hr   Continuous fetal monitoring and tocometry   Analgesia at maternal request   Vertex by TAUS  GBS negative   Induction plan vaginal cytotec, joshua balloon, pitocin (SVE 0/0/-4 on admission)   Postpartum contraception plan: declines       Anemia during pregnancy  Assessment & Plan  Admission Hgb 10.1  Takes ferrous sulfate 325mg once per day    Chronic hypertension affecting pregnancy  Assessment & Plan  /82 on admission   Takes labetalol 200mg BID (procardia gave her headaches)  Admission Pre-E labs WNL    Rubella non-immune status, antepartum  Assessment & Plan  Can offer vaccination postpartum         Disposition    - Anticipate discharge home on PPD# 2      Subjective/Objective     Chief Complaint: Postpartum State     Subjective:    Portia Gonzalez is PPD/POD#1 s/p  spontaneous vaginal delivery. She has no current complaints.  Pain is well controlled.  Patient is currently voiding.  She is ambulating.  Patient is currently passing flatus and has had no bowel movement. She is tolerating PO, and denies nausea or vomitting. Patient denies fever, chills, chest pain, shortness of breath, or calf tenderness. Lochia is normal. She is  Bottle feeding. She is recovering well and is stable.       Vitals:   /83 (BP Location: Right arm)   Pulse 105   Temp 98 °F (36.7 °C) (Temporal)   Resp 18   Ht 5' 3\" (1.6 m)   Wt 85.3 kg (188 lb)   LMP 10/17/2023 (Exact Date)   SpO2 98%   Breastfeeding Unknown   BMI 33.30 kg/m²       Intake/Output Summary (Last 24 hours) at 2024 " 0551  Last data filed at 7/12/2024 0327  Gross per 24 hour   Intake --   Output 1850 ml   Net -1850 ml       Invasive Devices       Peripheral Intravenous Line  Duration             Peripheral IV 07/10/24 Dorsal (posterior);Left Hand 1 day                    Physical Exam:   GEN: Portia Gonzalez appears well, alert and oriented x 3, pleasant and cooperative   CARDIO: RRR, no murmurs or rubs  RESP:  CTAB, no wheezes or rales  ABDOMEN: soft, no tenderness, no distention, fundus @ level of umbilicus,   EXTREMITIES: SCDs on, non tender, no erythema, b/l Elizabeth's sign negative      Labs:     Hemoglobin   Date Value Ref Range Status   07/10/2024 10.1 (L) 11.5 - 15.4 g/dL Final   06/20/2024 10.6 (L) 11.5 - 15.4 g/dL Final     WBC   Date Value Ref Range Status   07/10/2024 8.04 4.31 - 10.16 Thousand/uL Final   06/20/2024 7.22 4.31 - 10.16 Thousand/uL Final     Platelets   Date Value Ref Range Status   07/10/2024 336 149 - 390 Thousands/uL Final   06/20/2024 315 149 - 390 Thousands/uL Final     Creatinine   Date Value Ref Range Status   07/10/2024 0.55 (L) 0.60 - 1.30 mg/dL Final     Comment:     Standardized to IDMS reference method   06/20/2024 0.49 (L) 0.60 - 1.30 mg/dL Final     Comment:     Standardized to IDMS reference method     AST   Date Value Ref Range Status   07/10/2024 17 13 - 39 U/L Final   06/20/2024 21 13 - 39 U/L Final     ALT   Date Value Ref Range Status   07/10/2024 11 7 - 52 U/L Final     Comment:     Specimen collection should occur prior to Sulfasalazine administration due to the potential for falsely depressed results.    06/20/2024 13 7 - 52 U/L Final     Comment:     Specimen collection should occur prior to Sulfasalazine administration due to the potential for falsely depressed results.           Charisma Sheikh MD  7/12/2024  5:51 AM

## 2024-07-12 NOTE — DISCHARGE SUMMARY
Discharge Summary - Portia Gonzalez 19 y.o. female MRN: [id number]    Unit/Bed#: -01 Encounter: [id number]    ADMISSION  Admission Date: 7/10/2024   Admitting Attending: Dr. Margaux Gibson MD  Admitting Diagnoses:   Patient Active Problem List   Diagnosis    37 weeks gestation of pregnancy    Supervision of normal first pregnancy, antepartum    Marijuana use    History of atrial septal defect    Rubella non-immune status, antepartum    Chronic hypertension affecting pregnancy    Anemia during pregnancy     (spontaneous vaginal delivery)       DELIVERY  Delivery Method: Vaginal, Spontaneous   Delivery Date and Time: 2024 10:23 PM  Delivery Attending: Margaux Gibson    DISCHARGE  Discharge Date: 2024  Discharge Attending: Dr. Huerta  Discharge Diagnosis:   Same, Delivered    Clinical course: Admission to Delivery  Portia Gonzalez is a 19 y.o.  who was admitted at 38w1d for IOL in the setting of chronic HTN. Pressures remained WNL for most of her labor course, but reached max of 146/86. Pre-E labs WNL. On admission she was 0/0/-2 and given cytotec. SROM for clear fluid. She received pitocin for augmentation and an epidural for pain control. FHT remained category 1 for most of her labor, but was category 2 at one point due to prolonged 5 minute deceleration with nessa in the 70s that resolved with maternal repositioning and pit break. She progressed to complete and began pushing.      Delivery  Route of Delivery: Vaginal, Spontaneous          Anesthesia: Epidural,   QBL: Non-Surgical QBL (mL): 50      QBL:        Delivery: Vaginal, Spontaneous at 2024 10:23 PM  Laceration: Perineal: 2° Repaired? Yes    Baby's Weight: 3450 g (7 lb 9.7 oz); 121.69    Apgar scores: 8  and 9  at 1 and 5 minutes, respectively      Clinical Course: Post-Delivery:  The post delivery course was unremarkable.    On the day of discharge, the patient was ambulating, voiding spontaneously, tolerating oral  intake, and hemodynamically stable. She was able to reasonably perform all ADLs. She had appropriate bowel function. Pain was well-controlled. She was discharged home on postpartum/postop day #2 without complications. Patient was instructed to follow up with her OB as an outpatient and was given appropriate warnings to call her provider with problems or concerns.    Pertinent lab findings included:   Blood type A positive.     Last three Hgb values:  Lab Results   Component Value Date    HGB 10.1 (L) 07/10/2024    HGB 10.6 (L) 2024    HGB 10.0 (L) 2024        Problem-specific follow-up plans included the following:  Problem List          pregnancy    37 weeks gestation of pregnancy    Chronic hypertension affecting pregnancy    Overview     No diagnosis or meds prior to pregnancy   24 @ 18 wks:  /82,  repeat 130/ 82  3/5/24 @ 20 wks:  /82   148/90 with repeat of 130/72  EFW 34 weeks 80th%  Home BP log at 32-33 weeks: 120's-130's/90's-100's, Procardia XL 30 mg started   Due to headaches after taking procardia switched to labetalol 200 mg BID  IOL scheduled for 38 weeks          Current Assessment & Plan     CHTN- Takes labetalol 200mg BID (procardia gave her headaches)  BP well controlled  No c/o HA, vision changes or RUQ pain  Systolic (24hrs), Av , Min:117 , Max:133   Diastolic (24hrs), Av, Min:82, Max:88           History of atrial septal defect    Overview     Resolved by age2, no surgery required  Fetal echo normal  Maternal echo ordered - NORMAL no ASD          Marijuana use    Overview     Urine drug screen ordered: + THC (24)         Rubella non-immune status, antepartum    Overview     Rubella titre: borderline, consider postpartum vaccine         Current Assessment & Plan     Can offer vaccination postpartum          Supervision of normal first pregnancy, antepartum    Overview       CF/ SMA testing: declines  Flu vaccine: not completed-  declines  Covid  vaccine:  completed X 2  Aneuploidy screening declined. msAFP low risk  Declines tdap   Consents signed               Other    Anemia during pregnancy    Current Assessment & Plan     Admission Hgb 10.1  Takes ferrous sulfate 325mg once per day         * (Principal)  (spontaneous vaginal delivery)    Current Assessment & Plan     PPD #2 s/p   No c/o   Bldg decreasing, pain well controlled  VSS  Planning d/c today             Discharge med list:  Contraception: Declines at present     Medication List      ASK your doctor about these medications     ferrous sulfate 325 (65 FE) MG EC tablet   labetalol 200 mg tablet; Commonly known as: NORMODYNE; Take 1 tablet   (200 mg total) by mouth 2 (two) times a day   Prenatal Vitamin 27-0.8 MG Tabs       Condition at discharge:   good     Disposition:   Home    Planned Readmission:   No

## 2024-07-12 NOTE — OB LABOR/OXYTOCIN SAFETY PROGRESS
Oxytocin Safety Progress Check Note - Portia Gonzalez 19 y.o. female MRN: [id number]    Unit/Bed#: -01 Encounter: [id number]    Dose (bartolo-units/min) Oxytocin: 6 bartolo-units/min  Contraction Frequency (minutes): 2-3  Contraction Intensity: Moderate  Uterine Activity Characteristics: Regular  Cervical Dilation: 10  Dilation Complete Date: 24  Dilation Complete Time:   Cervical Effacement: 100  Fetal Station: 1  Baseline Rate (FHR): 135 bpm  Fetal Heart Rate (FHT): 130 BPM  FHR Category: I               Vital Signs:   Vitals:    24   BP: 122/69   Pulse: 85   Resp:    Temp:    SpO2:        Notes/comments:   Portia is reporting constant rectal pressure, SVE as above. FHR Category I. Anticipate starting pushing soon and  to follow.    Unique Ceron MD 2024 9:27 PM

## 2024-07-13 VITALS
BODY MASS INDEX: 33.31 KG/M2 | HEART RATE: 83 BPM | OXYGEN SATURATION: 98 % | HEIGHT: 63 IN | DIASTOLIC BLOOD PRESSURE: 87 MMHG | WEIGHT: 188 LBS | SYSTOLIC BLOOD PRESSURE: 137 MMHG | TEMPERATURE: 98 F | RESPIRATION RATE: 17 BRPM

## 2024-07-13 PROCEDURE — 99024 POSTOP FOLLOW-UP VISIT: CPT | Performed by: CLINICAL NURSE SPECIALIST

## 2024-07-13 RX ORDER — LABETALOL 200 MG/1
200 TABLET, FILM COATED ORAL 2 TIMES DAILY
Start: 2024-07-13

## 2024-07-13 RX ORDER — ACETAMINOPHEN 325 MG/1
650 TABLET ORAL EVERY 4 HOURS PRN
Start: 2024-07-13

## 2024-07-13 RX ORDER — IBUPROFEN 600 MG/1
600 TABLET ORAL EVERY 6 HOURS
Start: 2024-07-13

## 2024-07-13 RX ADMIN — SENNOSIDES 8.6 MG: 8.6 TABLET, FILM COATED ORAL at 09:07

## 2024-07-13 RX ADMIN — IBUPROFEN 600 MG: 600 TABLET, FILM COATED ORAL at 00:33

## 2024-07-13 RX ADMIN — IBUPROFEN 600 MG: 600 TABLET, FILM COATED ORAL at 06:19

## 2024-07-13 RX ADMIN — IBUPROFEN 600 MG: 600 TABLET, FILM COATED ORAL at 12:11

## 2024-07-13 RX ADMIN — LABETALOL HYDROCHLORIDE 200 MG: 200 TABLET, FILM COATED ORAL at 09:07

## 2024-07-13 NOTE — PLAN OF CARE

## 2024-07-13 NOTE — DISCHARGE INSTR - AVS FIRST PAGE
Discharge medications and instructions:   Discharge instructions/Information to patient and family:   -Do not place anything (no partner, tampons or douche) in your vagina for 6 weeks.  -You may walk for exercise for the first 6 weeks then gradually return to your usual activities.   -Please do not drive for 1 week if you have no stitches and for 2 weeks if you have stitches or underwent a  delivery.    -You may take baths or shower per your preference.   -Please look at your bust (breasts) in the mirror daily and call for redness or tenderness or increased warmth.   -Please call us for temperature > 100.4*F or 38* C, worsening pain or a foul discharge.

## 2024-07-13 NOTE — PROGRESS NOTES
"Progress Note - OB/GYN  Portia Gonzalez 19 y.o. female MRN: [id number]  Unit/Bed#:  415-01 Encounter: [id number]    Assessment and Plan     Portia Gonzalez is a patient of: [facility]. She is PPD#  2 s/p  spontaneous vaginal delivery  Recovering well and is stable       Anemia during pregnancy  Assessment & Plan  Admission Hgb 10.1  Takes ferrous sulfate 325mg once per day    Chronic hypertension affecting pregnancy  Assessment & Plan  CHTN- Takes labetalol 200mg BID (procardia gave her headaches)  BP well controlled  No c/o HA, vision changes or RUQ pain  Systolic (24hrs), Av , Min:117 , Max:133   Diastolic (24hrs), Av, Min:82, Max:88      Rubella non-immune status, antepartum  Assessment & Plan  Can offer vaccination postpartum     *  (spontaneous vaginal delivery)  Assessment & Plan  PPD #2 s/p   No c/o   Bldg decreasing, pain well controlled  VSS  Planning d/c today        Disposition    - Anticipate discharge home on PPD# 2      Subjective/Objective     Chief Complaint: Postpartum State     Subjective:    Portia Gonzalez is PPD/POD#2 s/p  spontaneous vaginal delivery. She has no current complaints.  Pain is well controlled. She is recovering well and is stable.   Denies HA, vision changes or RUQ Pain  Breastfeeding:  no    Tolerating PO: yes  Nausea or Vomiting: no  Voiding: yes  Flatus: yes; has had no bowel movement.   Ambulating: yes  Chest pain: no  Shortness of breath: no  Leg pain: no  Lochia: appropriate     Vitals:   /83 (BP Location: Right arm)   Pulse 75   Temp 97.8 °F (36.6 °C) (Oral)   Resp 18   Ht 5' 3\" (1.6 m)   Wt 85.3 kg (188 lb)   LMP 10/17/2023 (Exact Date)   SpO2 93%   Breastfeeding No   BMI 33.30 kg/m²     No intake or output data in the 24 hours ending 24 0809    Invasive Devices       None                   Physical Exam:   GEN: Portia Gonzalez appears well, alert and oriented x 3, pleasant and cooperative   CARDIO: RRR, no murmurs or rubs  RESP:  " CTAB, no wheezes or rales  ABDOMEN: soft, no tenderness, no distention, fundus @ U-1,   EXTREMITIES: SCDs on, non tender, no erythema, No swelling, b/l Elizabeth's sign negative    Labs:     Hemoglobin   Date Value Ref Range Status   07/10/2024 10.1 (L) 11.5 - 15.4 g/dL Final   06/20/2024 10.6 (L) 11.5 - 15.4 g/dL Final     WBC   Date Value Ref Range Status   07/10/2024 8.04 4.31 - 10.16 Thousand/uL Final   06/20/2024 7.22 4.31 - 10.16 Thousand/uL Final     Platelets   Date Value Ref Range Status   07/10/2024 336 149 - 390 Thousands/uL Final   06/20/2024 315 149 - 390 Thousands/uL Final     Creatinine   Date Value Ref Range Status   07/10/2024 0.55 (L) 0.60 - 1.30 mg/dL Final     Comment:     Standardized to IDMS reference method   06/20/2024 0.49 (L) 0.60 - 1.30 mg/dL Final     Comment:     Standardized to IDMS reference method     AST   Date Value Ref Range Status   07/10/2024 17 13 - 39 U/L Final   06/20/2024 21 13 - 39 U/L Final     ALT   Date Value Ref Range Status   07/10/2024 11 7 - 52 U/L Final     Comment:     Specimen collection should occur prior to Sulfasalazine administration due to the potential for falsely depressed results.    06/20/2024 13 7 - 52 U/L Final     Comment:     Specimen collection should occur prior to Sulfasalazine administration due to the potential for falsely depressed results.           DEISI Hanna  7/13/2024  8:09 AM

## 2024-07-13 NOTE — PLAN OF CARE

## 2024-07-18 PROCEDURE — 88307 TISSUE EXAM BY PATHOLOGIST: CPT | Performed by: PATHOLOGY

## 2024-08-08 ENCOUNTER — POSTPARTUM VISIT (OUTPATIENT)
Age: 19
End: 2024-08-08

## 2024-08-08 VITALS
TEMPERATURE: 98.1 F | HEART RATE: 90 BPM | BODY MASS INDEX: 28.42 KG/M2 | DIASTOLIC BLOOD PRESSURE: 88 MMHG | HEIGHT: 63 IN | WEIGHT: 160.4 LBS | SYSTOLIC BLOOD PRESSURE: 130 MMHG | OXYGEN SATURATION: 99 %

## 2024-08-08 PROBLEM — Z3A.37 37 WEEKS GESTATION OF PREGNANCY: Status: RESOLVED | Noted: 2023-12-05 | Resolved: 2024-08-08

## 2024-08-08 PROBLEM — Z34.00 SUPERVISION OF NORMAL FIRST PREGNANCY, ANTEPARTUM: Status: RESOLVED | Noted: 2023-12-28 | Resolved: 2024-08-08

## 2024-08-08 PROBLEM — O10.919 CHRONIC HYPERTENSION AFFECTING PREGNANCY: Status: RESOLVED | Noted: 2024-02-22 | Resolved: 2024-08-08

## 2024-08-08 PROBLEM — O09.899 RUBELLA NON-IMMUNE STATUS, ANTEPARTUM: Status: RESOLVED | Noted: 2024-01-26 | Resolved: 2024-08-08

## 2024-08-08 PROBLEM — Z28.39 RUBELLA NON-IMMUNE STATUS, ANTEPARTUM: Status: RESOLVED | Noted: 2024-01-26 | Resolved: 2024-08-08

## 2024-08-08 PROCEDURE — 99024 POSTOP FOLLOW-UP VISIT: CPT | Performed by: OBSTETRICS & GYNECOLOGY

## 2024-08-08 NOTE — ASSESSMENT & PLAN NOTE
4wks PostPartum.  Normal postpartum exam.   The patient may advance activity as tolerated and may resume sexual activity at 6 wks PP.  Contraception discussed. Patient plans: condoms. Risks of short interval pregnancy reviewed  EDPS score 2. Signs and symptoms of postpartum depression reviewed  Advised to continue labetalol for another 2 weeks, then f/u with PCP regarding long term HTN management  F/u in 6-12 months for annual exam  Precautions reviewed

## 2024-08-08 NOTE — PROGRESS NOTES
Patient ID: Portia Gonzalez is a 19 y.o. female.    Chief Complaint   Patient presents with    Postpartum Care         She presents for routine postpartum visit.   She is now a  who is 4wks s/p  on 24 - IOL at 38+ WGA for cHTN.  Anesthesia: epidural   Perineum:  2nd degree laceration       Postpartum course was uncomplicated, discharged home on PPD#2. BP remained stable on antepartum regimen of labetalol 200 mg BID.    Since d/c home she has had no complaints.   She denies abn bleeding, pelvic pain, breast complaints, bowel/bladder dysfunction, depression/anx.   Baby BOY is thriving and is Bottle feeding/formula     VB stopped  No other concerns  Still taking labetalol     [place]  Depression Scale Total: 2  Plans for contraception: condoms          The following portions of the patient's history were reviewed and updated as appropriate: allergies, current medications, past family history, past medical history, past social history, past surgical history, and problem list.    Review of Systems   Constitutional:  Negative for appetite change, chills and fever.   Eyes:  Negative for visual disturbance.   Respiratory:  Negative for cough, chest tightness and shortness of breath.    Cardiovascular:  Negative for chest pain.   Gastrointestinal:  Negative for abdominal distention, abdominal pain, constipation, diarrhea, nausea and vomiting.   Endocrine: Negative for cold intolerance and heat intolerance.   Genitourinary:  Negative for difficulty urinating, dyspareunia, dysuria, frequency, genital sores, pelvic pain, urgency, vaginal bleeding, vaginal discharge and vaginal pain.   Musculoskeletal:  Negative for arthralgias.   Neurological:  Negative for light-headedness and headaches.   Hematological:  Does not bruise/bleed easily.   Psychiatric/Behavioral:  Negative for behavioral problems.    All other systems reviewed and are negative.               Objective:  /88   Pulse 90   Temp  "98.1 °F (36.7 °C) (Temporal)   Ht 5' 3\" (1.6 m)   Wt 72.8 kg (160 lb 6.4 oz)   LMP 10/17/2023 (Exact Date)   SpO2 99%   BMI 28.41 kg/m²     Physical Exam  Constitutional:       General: She is not in acute distress.     Appearance: Normal appearance. She is not ill-appearing.   Genitourinary:      Bladder and urethral meatus normal.      Right Labia: No rash, tenderness, lesions or skin changes.     Left Labia: No tenderness, lesions, skin changes or rash.     No labial fusion noted.      No inguinal adenopathy present in the right or left side.     No vaginal discharge, erythema, tenderness, bleeding or ulceration.        Right Adnexa: not tender, not full and no mass present.     Left Adnexa: not tender, not full and no mass present.     No cervical motion tenderness, discharge, friability, lesion, polyp or eversion.      Uterus is not enlarged, fixed, tender or irregular.      No uterine mass detected.     Uterus is anteverted.      Pelvic exam was performed with patient in the lithotomy position.   HENT:      Head: Normocephalic.   Cardiovascular:      Rate and Rhythm: Normal rate.      Heart sounds: Normal heart sounds.   Pulmonary:      Effort: Pulmonary effort is normal. No accessory muscle usage or respiratory distress.   Abdominal:      General: There is no distension.      Palpations: Abdomen is soft. There is no mass.      Tenderness: There is no abdominal tenderness. There is no guarding or rebound.   Musculoskeletal:         General: Normal range of motion.      Cervical back: No rigidity.   Lymphadenopathy:      Lower Body: No right inguinal adenopathy. No left inguinal adenopathy.   Neurological:      General: No focal deficit present.      Mental Status: She is alert. Mental status is at baseline.   Skin:     General: Skin is warm and dry.   Psychiatric:         Mood and Affect: Mood normal.         Behavior: Behavior normal.   Vitals and nursing note reviewed. Exam conducted with a chaperone " present.               EDPS SCORE 2        Assessment/Plan:    Problem List Items Addressed This Visit       Encounter for routine postpartum follow-up - Primary     4wks PostPartum.  Normal postpartum exam.   The patient may advance activity as tolerated and may resume sexual activity at 6 wks PP.  Contraception discussed. Patient plans: condoms. Risks of short interval pregnancy reviewed  EDPS score 2. Signs and symptoms of postpartum depression reviewed  Advised to continue labetalol for another 2 weeks, then f/u with PCP regarding long term HTN management  F/u in 6-12 months for annual exam  Precautions reviewed